# Patient Record
Sex: MALE | Race: WHITE | NOT HISPANIC OR LATINO | Employment: FULL TIME | URBAN - METROPOLITAN AREA
[De-identification: names, ages, dates, MRNs, and addresses within clinical notes are randomized per-mention and may not be internally consistent; named-entity substitution may affect disease eponyms.]

---

## 2017-01-20 RX ORDER — OMEPRAZOLE 20 MG/1
CAPSULE, DELAYED RELEASE ORAL
Qty: 30 CAPSULE | Refills: 5 | Status: SHIPPED | OUTPATIENT
Start: 2017-01-20 | End: 2017-07-28 | Stop reason: ALTCHOICE

## 2017-07-12 RX ORDER — OMEPRAZOLE 20 MG/1
CAPSULE, DELAYED RELEASE ORAL
Qty: 30 CAPSULE | Refills: 0 | OUTPATIENT
Start: 2017-07-12

## 2017-07-17 RX ORDER — OMEPRAZOLE 20 MG/1
CAPSULE, DELAYED RELEASE ORAL
Qty: 30 CAPSULE | Refills: 0 | OUTPATIENT
Start: 2017-07-17

## 2017-07-26 RX ORDER — OMEPRAZOLE 20 MG/1
CAPSULE, DELAYED RELEASE ORAL
Qty: 30 CAPSULE | Refills: 0 | OUTPATIENT
Start: 2017-07-26

## 2017-07-27 ENCOUNTER — TELEPHONE (OUTPATIENT)
Dept: FAMILY MEDICINE CLINIC | Facility: CLINIC | Age: 27
End: 2017-07-27

## 2017-07-27 NOTE — TELEPHONE ENCOUNTER
Patient was notified he has not been seen in the office in 2 years and will need an appt in order to refill medication. appt was scheduled.

## 2017-07-28 ENCOUNTER — OFFICE VISIT (OUTPATIENT)
Dept: FAMILY MEDICINE CLINIC | Facility: CLINIC | Age: 27
End: 2017-07-28

## 2017-07-28 VITALS
SYSTOLIC BLOOD PRESSURE: 126 MMHG | HEART RATE: 95 BPM | WEIGHT: 195 LBS | DIASTOLIC BLOOD PRESSURE: 76 MMHG | OXYGEN SATURATION: 98 % | BODY MASS INDEX: 27.3 KG/M2 | HEIGHT: 71 IN

## 2017-07-28 DIAGNOSIS — R12 HEARTBURN: ICD-10-CM

## 2017-07-28 DIAGNOSIS — Z13.220 ENCOUNTER FOR LIPID SCREENING FOR CARDIOVASCULAR DISEASE: Primary | ICD-10-CM

## 2017-07-28 DIAGNOSIS — Z13.6 ENCOUNTER FOR LIPID SCREENING FOR CARDIOVASCULAR DISEASE: Primary | ICD-10-CM

## 2017-07-28 PROBLEM — IMO0001 BRASH: Status: ACTIVE | Noted: 2017-07-28

## 2017-07-28 PROBLEM — J45.909 AIRWAY HYPERREACTIVITY: Status: ACTIVE | Noted: 2017-07-28

## 2017-07-28 PROCEDURE — 99213 OFFICE O/P EST LOW 20 MIN: CPT | Performed by: NURSE PRACTITIONER

## 2017-07-28 RX ORDER — ALFUZOSIN HYDROCHLORIDE 10 MG/1
TABLET, EXTENDED RELEASE ORAL
Refills: 3 | COMMUNITY
Start: 2017-07-03

## 2017-07-28 RX ORDER — FAMOTIDINE 20 MG/1
20 TABLET, FILM COATED ORAL 2 TIMES DAILY
Qty: 60 TABLET | Refills: 11 | Status: SHIPPED | OUTPATIENT
Start: 2017-07-28 | End: 2018-07-17 | Stop reason: SDUPTHER

## 2017-07-28 NOTE — PROGRESS NOTES
"Subjective   Evan Jose is a 26 y.o. male who is here for his yearly exam.     History of Present Illness   26yr old male, strong FH heartburn, no relatives with Lazo's esophagus, distant history of upper scope, desires refill on omeprazole. Eating more vegetables, less meat, and thinks helping heartburn. Takes prilosec daily, if skips by noon will have heartburn.     Asthma doing well, 2 x inhaler use in the last year, when around cats. Otherwise doesn't need.     Last cholesterol check unknown (>2 yrs), no FH heart disease, 2 grandparents with afib. FH prostate, bladder, and gastric cancer. No colon cancers.     Still seeing urology annually for enlarged prostate.     The following portions of the patient's history were reviewed and updated as appropriate: allergies, current medications, past family history, past medical history, past social history, past surgical history and problem list.    Review of Systems   Constitutional: Negative.  Negative for activity change and unexpected weight change.   HENT: Negative.    Eyes: Negative.  Negative for visual disturbance.   Respiratory: Negative.    Cardiovascular: Negative.  Negative for chest pain.   Gastrointestinal: Negative for constipation and diarrhea.        Heartburn significant   Endocrine: Negative.    Genitourinary: Positive for difficulty urinating. Negative for frequency.        Enlarged prostate   Musculoskeletal: Negative.    Neurological: Negative for weakness and headaches.   Hematological: Negative.    Psychiatric/Behavioral: Negative.  Negative for dysphoric mood.     /76 (BP Location: Left arm, Patient Position: Sitting, Cuff Size: Adult)  Pulse 95  Ht 71\" (180.3 cm)  Wt 195 lb (88.5 kg)  SpO2 98%  BMI 27.2 kg/m2    Objective   Physical Exam   Constitutional: He appears well-developed and well-nourished.   HENT:   Head: Normocephalic and atraumatic.   Neck: Normal range of motion. Neck supple. No thyromegaly present. "   Cardiovascular: Normal rate, regular rhythm and normal heart sounds.    Pulses:       Carotid pulses are 2+ on the right side, and 2+ on the left side.  Pulmonary/Chest: Effort normal and breath sounds normal.   Abdominal: Soft. Bowel sounds are normal.   Lymphadenopathy:     He has no cervical adenopathy.   Skin: He is not diaphoretic.   Psychiatric: He has a normal mood and affect. His behavior is normal. Judgment and thought content normal.       Assessment/Plan   Problems Addressed this Visit        Digestive    Brash    Relevant Medications    famotidine (PEPCID) 20 MG tablet      Other Visit Diagnoses     Encounter for lipid screening for cardiovascular disease    -  Primary    Relevant Orders    Lipid Panel        Last seen in 2015, would recommend trial of H2 to address heartburn, retrial omeprazole if insufficient relief. Consider repeat upper scope    Strongly encouraged smoking cessation. Has been vaping last 1 1/2 yrs, encouraged to wean off.     Encouraged to restart exercise as out of routine. FU 1-2 yrs.

## 2017-07-29 LAB
ALBUMIN SERPL-MCNC: 5 G/DL (ref 3.5–5.5)
ALBUMIN/GLOB SERPL: 2 {RATIO} (ref 1.2–2.2)
ALP SERPL-CCNC: 90 IU/L (ref 39–117)
ALT SERPL-CCNC: 26 IU/L (ref 0–44)
AST SERPL-CCNC: 23 IU/L (ref 0–40)
BASOPHILS # BLD AUTO: 0 X10E3/UL (ref 0–0.2)
BASOPHILS NFR BLD AUTO: 0 %
BILIRUB SERPL-MCNC: 0.6 MG/DL (ref 0–1.2)
BUN SERPL-MCNC: 13 MG/DL (ref 6–20)
BUN/CREAT SERPL: 14 (ref 9–20)
CALCIUM SERPL-MCNC: 9.9 MG/DL (ref 8.7–10.2)
CHLORIDE SERPL-SCNC: 100 MMOL/L (ref 96–106)
CHOLEST SERPL-MCNC: 190 MG/DL (ref 100–199)
CO2 SERPL-SCNC: 24 MMOL/L (ref 18–29)
CREAT SERPL-MCNC: 0.9 MG/DL (ref 0.76–1.27)
EOSINOPHIL # BLD AUTO: 0.2 X10E3/UL (ref 0–0.4)
EOSINOPHIL NFR BLD AUTO: 3 %
ERYTHROCYTE [DISTWIDTH] IN BLOOD BY AUTOMATED COUNT: 13.9 % (ref 12.3–15.4)
GLOBULIN SER CALC-MCNC: 2.5 G/DL (ref 1.5–4.5)
GLUCOSE SERPL-MCNC: 90 MG/DL (ref 65–99)
HCT VFR BLD AUTO: 48.9 % (ref 37.5–51)
HDLC SERPL-MCNC: 53 MG/DL
HGB BLD-MCNC: 16.6 G/DL (ref 12.6–17.7)
IMM GRANULOCYTES # BLD: 0 X10E3/UL (ref 0–0.1)
IMM GRANULOCYTES NFR BLD: 0 %
LDLC SERPL CALC-MCNC: 117 MG/DL (ref 0–99)
LYMPHOCYTES # BLD AUTO: 1.5 X10E3/UL (ref 0.7–3.1)
LYMPHOCYTES NFR BLD AUTO: 25 %
MCH RBC QN AUTO: 30.2 PG (ref 26.6–33)
MCHC RBC AUTO-ENTMCNC: 33.9 G/DL (ref 31.5–35.7)
MCV RBC AUTO: 89 FL (ref 79–97)
MONOCYTES # BLD AUTO: 0.3 X10E3/UL (ref 0.1–0.9)
MONOCYTES NFR BLD AUTO: 6 %
NEUTROPHILS # BLD AUTO: 3.9 X10E3/UL (ref 1.4–7)
NEUTROPHILS NFR BLD AUTO: 66 %
PLATELET # BLD AUTO: 230 X10E3/UL (ref 150–379)
POTASSIUM SERPL-SCNC: 4.7 MMOL/L (ref 3.5–5.2)
PROT SERPL-MCNC: 7.5 G/DL (ref 6–8.5)
RBC # BLD AUTO: 5.49 X10E6/UL (ref 4.14–5.8)
SODIUM SERPL-SCNC: 144 MMOL/L (ref 134–144)
TRIGL SERPL-MCNC: 102 MG/DL (ref 0–149)
VLDLC SERPL CALC-MCNC: 20 MG/DL (ref 5–40)
WBC # BLD AUTO: 5.9 X10E3/UL (ref 3.4–10.8)

## 2017-07-31 ENCOUNTER — TELEPHONE (OUTPATIENT)
Dept: FAMILY MEDICINE CLINIC | Facility: CLINIC | Age: 27
End: 2017-07-31

## 2017-07-31 NOTE — TELEPHONE ENCOUNTER
----- Message from CARINE Lilly sent at 7/29/2017 11:34 AM EDT -----  Normal lab results blood sugar and cholesterol screening normal.

## 2017-08-31 ENCOUNTER — OFFICE VISIT (OUTPATIENT)
Dept: FAMILY MEDICINE CLINIC | Facility: CLINIC | Age: 27
End: 2017-08-31

## 2017-08-31 VITALS
HEART RATE: 73 BPM | SYSTOLIC BLOOD PRESSURE: 112 MMHG | HEIGHT: 71 IN | BODY MASS INDEX: 27.16 KG/M2 | TEMPERATURE: 98.2 F | WEIGHT: 194 LBS | DIASTOLIC BLOOD PRESSURE: 64 MMHG | OXYGEN SATURATION: 97 %

## 2017-08-31 DIAGNOSIS — B34.9 VIRAL ILLNESS: ICD-10-CM

## 2017-08-31 DIAGNOSIS — R50.81 FEVER IN OTHER DISEASES: Primary | ICD-10-CM

## 2017-08-31 PROCEDURE — 99213 OFFICE O/P EST LOW 20 MIN: CPT | Performed by: NURSE PRACTITIONER

## 2017-09-06 LAB
B BURGDOR IGM SER IA-ACNC: <0.8 INDEX (ref 0–0.79)
ERYTHROCYTE [DISTWIDTH] IN BLOOD BY AUTOMATED COUNT: 13 % (ref 11.5–14.5)
HCT VFR BLD AUTO: 48.3 % (ref 40.4–52.2)
HGB BLD-MCNC: 16.2 G/DL (ref 13.7–17.6)
MCH RBC QN AUTO: 30.3 PG (ref 27–32.7)
MCHC RBC AUTO-ENTMCNC: 33.5 G/DL (ref 32.6–36.4)
MCV RBC AUTO: 90.3 FL (ref 79.8–96.2)
PLATELET # BLD AUTO: 170 10*3/MM3 (ref 140–500)
R RICKETTSI IGG SER QL IA: POSITIVE
R RICKETTSI IGG TITR SER IF: ABNORMAL {TITER}
R RICKETTSI IGM TITR SER: 0.3 INDEX (ref 0–0.89)
RBC # BLD AUTO: 5.35 10*6/MM3 (ref 4.6–6)
WBC # BLD AUTO: 2.65 10*3/MM3 (ref 4.5–10.7)
WNV IGG SER QL IA: NEGATIVE
WNV IGM SER QL IA: NEGATIVE

## 2017-09-07 ENCOUNTER — TELEPHONE (OUTPATIENT)
Dept: FAMILY MEDICINE CLINIC | Facility: CLINIC | Age: 27
End: 2017-09-07

## 2017-09-07 RX ORDER — DOXYCYCLINE HYCLATE 100 MG
100 TABLET ORAL 2 TIMES DAILY
Qty: 14 TABLET | Refills: 0 | Status: SHIPPED | OUTPATIENT
Start: 2017-09-07 | End: 2018-06-29

## 2018-06-29 ENCOUNTER — OFFICE VISIT (OUTPATIENT)
Dept: FAMILY MEDICINE CLINIC | Facility: CLINIC | Age: 28
End: 2018-06-29

## 2018-06-29 VITALS
HEART RATE: 80 BPM | SYSTOLIC BLOOD PRESSURE: 118 MMHG | HEIGHT: 71 IN | BODY MASS INDEX: 25.9 KG/M2 | WEIGHT: 185 LBS | TEMPERATURE: 97.9 F | DIASTOLIC BLOOD PRESSURE: 62 MMHG | OXYGEN SATURATION: 96 %

## 2018-06-29 DIAGNOSIS — M25.50 ARTHRALGIA OF MULTIPLE JOINTS: ICD-10-CM
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
## 2018-06-29 LAB
HCT VFR BLDA CALC: 46.8 %
HGB BLDA-MCNC: 15.7 G/DL
MCH, POC: 30.1
MCHC, POC: 33.6
MCV, POC: 89.5
PLATELET # BLD AUTO: 210 10*3/MM3
PMV BLD: 6.8 FL
RBC, POC: 5.23
RDW, POC: 12.9
WBC # BLD: 5.4 10*3/UL

## 2018-06-29 PROCEDURE — 99213 OFFICE O/P EST LOW 20 MIN: CPT | Performed by: NURSE PRACTITIONER

## 2018-06-29 PROCEDURE — 85027 COMPLETE CBC AUTOMATED: CPT | Performed by: NURSE PRACTITIONER

## 2018-06-29 NOTE — PROGRESS NOTES
"Subjective   Evan Jose is a 27 y.o. male who presents c/o joint aches all over x 2-3 weeks. Had tick bite in May and several mosquito bites 3 weeks ago.     History of Present Illness   Aching worse in the last few weeks. 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 Has travelled to [place] and got bit by mosquitos there. 1 tick did get engorged behind the L knee, on body for 24 hours. No red hot or swollen joints, no fever. No rash or bull's eye after tick removed.   The following portions of the patient's history were reviewed and updated as appropriate: allergies, current medications, past family history, past medical history, past social history, past surgical history and problem list.    Review of Systems   Constitutional: Negative for chills and fever.   Respiratory: Negative.    Cardiovascular: Negative.    Musculoskeletal: Positive for arthralgias (low level ache, better with activity. ) and back pain. Negative for joint swelling, neck pain and neck stiffness.   Skin: Negative.    Psychiatric/Behavioral: Negative.      /62   Pulse 80   Temp 97.9 °F (36.6 °C) (Oral)   Ht 180.3 cm (71\")   Wt 83.9 kg (185 lb)   SpO2 96%   BMI 25.80 kg/m²     Objective   Physical Exam   Constitutional: He is oriented to person, place, and time. He appears well-developed and well-nourished.   Cardiovascular: Normal rate, regular rhythm and normal heart sounds.    Pulmonary/Chest: Effort normal and breath sounds normal.   Musculoskeletal:        Right elbow: Normal.       Left elbow: Normal.        Right knee: Normal.        Left knee: Normal.        Right ankle: Normal.        Left ankle: Normal.   Neurological: He is alert and oriented to person, place, and time.   Psychiatric: He has a normal mood and affect. His behavior is normal. Judgment and thought content normal.   Nursing note and vitals reviewed.    Assessment/Plan   Problems Addressed this Visit     None      Visit Diagnoses     Tick " bite, initial encounter    -  Primary    Relevant Orders    POC CBC    C-reactive Protein    Sedimentation Rate    Arthralgia of multiple joints        Relevant Orders    POC CBC    C-reactive Protein    Sedimentation Rate        Increase use of bug spray, permetherin to clothing for tick avoidance. Doubt role of doxycycline for tick bite as 3 weeks ago and no rash. Will check inflammatory markers. FU if not settling 2 weeks.   WBC 5.4 today, await inflammatory markers.

## 2018-06-30 LAB
CRP SERPL-MCNC: 0.4 MG/L (ref 0–4.9)
ERYTHROCYTE [SEDIMENTATION RATE] IN BLOOD BY WESTERGREN METHOD: 2 MM/HR (ref 0–15)

## 2018-07-17 DIAGNOSIS — R12 HEARTBURN: ICD-10-CM

## 2018-07-17 RX ORDER — FAMOTIDINE 20 MG/1
TABLET, FILM COATED ORAL
Qty: 60 TABLET | Refills: 5 | Status: SHIPPED | OUTPATIENT
Start: 2018-07-17 | End: 2019-01-08 | Stop reason: SDUPTHER

## 2018-09-04 ENCOUNTER — OFFICE VISIT (OUTPATIENT)
Dept: FAMILY MEDICINE CLINIC | Facility: CLINIC | Age: 28
End: 2018-09-04

## 2018-09-04 VITALS
BODY MASS INDEX: 26.18 KG/M2 | HEART RATE: 81 BPM | SYSTOLIC BLOOD PRESSURE: 120 MMHG | DIASTOLIC BLOOD PRESSURE: 74 MMHG | OXYGEN SATURATION: 98 % | HEIGHT: 71 IN | TEMPERATURE: 99 F | WEIGHT: 187 LBS
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
## 2018-09-04 DIAGNOSIS — R50.9 FEVER, UNSPECIFIED FEVER CAUSE: Primary | ICD-10-CM

## 2018-09-04 PROCEDURE — 99213 OFFICE O/P EST LOW 20 MIN: CPT | Performed by: NURSE PRACTITIONER

## 2018-09-04 RX ORDER — DOXYCYCLINE HYCLATE 100 MG/1
100 CAPSULE ORAL 2 TIMES DAILY
Qty: 14 CAPSULE | Refills: 0 | Status: SHIPPED | OUTPATIENT
Start: 2018-09-04 | End: 2019-06-06

## 2018-09-04 NOTE — PROGRESS NOTES
"Subjective   Evan Jose is a 27 y.o. male who presents c/o fever, headache, body aches x 4 days. Also with bumps to legs and feet that are spreading. [place]     History of Present Illness   Aches in elbows shoulders and back, waking covered in sweat. Red bumps not healing, and seem to be spreading to madhu ankles. Did have 2 seed ticks attached to feet. Not engorged.   The following portions of the patient's history were reviewed and updated as appropriate: allergies, current medications, past family history, past medical history, past social history, past surgical history and problem list.    Review of Systems   Constitutional: Positive for chills, diaphoresis and fever. Negative for unexpected weight gain and unexpected weight loss.   HENT: Negative for congestion and sore throat.    Cardiovascular: Negative.    Gastrointestinal: Positive for nausea. Negative for diarrhea and vomiting.   Musculoskeletal: Positive for arthralgias and back pain.   Neurological: Positive for headache.   Hematological: Positive for adenopathy.   Psychiatric/Behavioral: Negative.      /74   Pulse 81   Temp 99 °F (37.2 °C) (Oral)   Ht 180.3 cm (71\")   Wt 84.8 kg (187 lb)   SpO2 98%   BMI 26.08 kg/m²     Objective   Physical Exam   Constitutional: He is oriented to person, place, and time. He appears well-developed and well-nourished.   Neck: Normal range of motion and full passive range of motion without pain. Neck supple.   Cardiovascular: Normal rate, regular rhythm and normal heart sounds.    Pulmonary/Chest: Effort normal and breath sounds normal.   Abdominal: Soft. Bowel sounds are normal. There is no tenderness. There is no CVA tenderness.   Musculoskeletal: Normal range of motion.        Right elbow: Normal.       Left elbow: Normal.        Lumbar back: Normal.   Neurological: He is alert and oriented to person, place, and time.   Skin: Skin is warm and dry. Capillary " refill takes less than 2 seconds.   1-2mm papules scattered to bilateral ankles   Psychiatric: He has a normal mood and affect. His behavior is normal. Judgment and thought content normal.   Nursing note and vitals reviewed.    Assessment/Plan   Problems Addressed this Visit     None      Visit Diagnoses     Fever, unspecified fever cause    -  Primary    Relevant Orders    CBC & Differential    Rickettsial Fever Group IgG / M    [place] SF (IgG / M)    Lyme, IgM, Early Test / Reflex    Tick bite, initial encounter        Relevant Medications    doxycycline (VIBRAMYCIN) 100 MG capsule    Other Relevant Orders    CBC & Differential    Rickettsial Fever Group IgG / M    Stuckey SF (IgG / M)    Lyme, IgM, Early Test / Reflex        Is using permethrin to clothing for tick avoidance, but still some tick bites, no classic bull's eye rash. Will treat for tick bourne illness. To return for tick titers if symptoms not settling 24 hours.

## 2019-01-08 DIAGNOSIS — R12 HEARTBURN: ICD-10-CM

## 2019-01-08 RX ORDER — FAMOTIDINE 20 MG/1
TABLET, FILM COATED ORAL
Qty: 60 TABLET | Refills: 2 | Status: SHIPPED | OUTPATIENT
Start: 2019-01-08 | End: 2019-04-06 | Stop reason: SDUPTHER

## 2019-04-06 DIAGNOSIS — R12 HEARTBURN: ICD-10-CM

## 2019-04-08 RX ORDER — FAMOTIDINE 20 MG/1
TABLET, FILM COATED ORAL
Qty: 60 TABLET | Refills: 0 | Status: SHIPPED | OUTPATIENT
Start: 2019-04-08 | End: 2019-05-05 | Stop reason: SDUPTHER

## 2019-05-05 DIAGNOSIS — R12 HEARTBURN: ICD-10-CM

## 2019-05-06 RX ORDER — FAMOTIDINE 20 MG/1
TABLET, FILM COATED ORAL
Qty: 60 TABLET | Refills: 3 | Status: SHIPPED | OUTPATIENT
Start: 2019-05-06 | End: 2019-09-01 | Stop reason: SDUPTHER

## 2019-06-06 ENCOUNTER — OFFICE VISIT (OUTPATIENT)
Dept: FAMILY MEDICINE CLINIC | Facility: CLINIC | Age: 29
End: 2019-06-06

## 2019-06-06 VITALS
OXYGEN SATURATION: 99 % | TEMPERATURE: 98.4 F | SYSTOLIC BLOOD PRESSURE: 120 MMHG | HEIGHT: 71 IN | DIASTOLIC BLOOD PRESSURE: 68 MMHG | WEIGHT: 175 LBS | BODY MASS INDEX: 24.5 KG/M2 | HEART RATE: 78 BPM
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
## 2019-06-06 DIAGNOSIS — Z23 IMMUNIZATION DUE: ICD-10-CM

## 2019-06-06 PROCEDURE — 90471 IMMUNIZATION ADMIN: CPT | Performed by: NURSE PRACTITIONER

## 2019-06-06 PROCEDURE — 99213 OFFICE O/P EST LOW 20 MIN: CPT | Performed by: NURSE PRACTITIONER

## 2019-06-06 PROCEDURE — 90714 TD VACC NO PRESV 7 YRS+ IM: CPT | Performed by: NURSE PRACTITIONER

## 2019-06-06 RX ORDER — DOXYCYCLINE HYCLATE 100 MG/1
100 CAPSULE ORAL 2 TIMES DAILY
Qty: 14 CAPSULE | Refills: 0 | Status: SHIPPED | OUTPATIENT
Start: 2019-06-06 | End: 2019-10-21

## 2019-06-06 NOTE — PROGRESS NOTES
"Subjective   Evan Jose is a 28 y.o. male. Patient presents today with two tick bites that are causing joint pain and body aches.     History of Present Illness   Tick bites occurred 3 weeks ago on vacation, did spray clothes with permethrin, still got bit. Ticks were attached for an unknown amount of time, 8-10 hours. Still has whelps and itch, did not have to be removed. Did not develop a rash. Having joint pain and body aches, some myalgias, knuckles elbows, knees and hips. Biceps, shoulders. Fished for 6 days. Initially attributed aches to the extensive fishing, but did not resolve with return to work, no fever.   The following portions of the patient's history were reviewed and updated as appropriate: allergies, current medications, past family history, past medical history, past social history, past surgical history and problem list.    Review of Systems   Constitutional: Positive for activity change. Negative for chills and fever.   Cardiovascular: Negative.    Gastrointestinal: Positive for abdominal pain (attributes to stress from a new job).   Musculoskeletal: Positive for arthralgias and myalgias.   Skin: Positive for skin lesions. Negative for rash.   Neurological: Positive for headache (rare).   Hematological: Negative for adenopathy.   Psychiatric/Behavioral: Negative.      /68 (BP Location: Left arm, Patient Position: Sitting, Cuff Size: Adult)   Pulse 78   Temp 98.4 °F (36.9 °C) (Oral)   Ht 180.3 cm (71\")   Wt 79.4 kg (175 lb)   SpO2 99%   BMI 24.41 kg/m²     Objective   Physical Exam   Constitutional: He appears well-developed and well-nourished.  Non-toxic appearance. No distress.   Cardiovascular: Normal rate.   Pulmonary/Chest: Effort normal.   Skin: Skin is warm and dry. No rash noted. He is not diaphoretic.   2mm red punctate bumps with white cores x 2 to L trunk   Psychiatric: He has a normal mood and affect. His behavior is normal. Judgment and thought content normal. "   Nursing note and vitals reviewed.    Assessment/Plan   Problems Addressed this Visit     None      Visit Diagnoses     Tick bite, initial encounter    -  Primary    Relevant Medications    doxycycline (VIBRAMYCIN) 100 MG capsule    Other Relevant Orders    CBC & Differential    Rickettsial Fever Group IgG / M    [place] SF (IgG / M)    Lyme, IgM, Early Test / Reflex    Immunization due        Relevant Orders    Td Vaccine Greater Than or Equal To 6yo Preservative Free IM (Completed)        As symptomatic, will go on and treat, recommend titers to evaluate reinfection. Follow up if any increase in symptoms.

## 2019-06-10 LAB
B BURGDOR IGM SER IA-ACNC: <0.8 INDEX (ref 0–0.79)
BASOPHILS # BLD AUTO: 0.01 10*3/MM3 (ref 0–0.2)
BASOPHILS NFR BLD AUTO: 0.2 % (ref 0–1.5)
EOSINOPHIL # BLD AUTO: 0.11 10*3/MM3 (ref 0–0.4)
EOSINOPHIL NFR BLD AUTO: 2.7 % (ref 0.3–6.2)
ERYTHROCYTE [DISTWIDTH] IN BLOOD BY AUTOMATED COUNT: 12.7 % (ref 12.3–15.4)
HCT VFR BLD AUTO: 49.9 % (ref 37.5–51)
HGB BLD-MCNC: 16.7 G/DL (ref 13–17.7)
IMM GRANULOCYTES # BLD AUTO: 0.01 10*3/MM3 (ref 0–0.05)
IMM GRANULOCYTES NFR BLD AUTO: 0.2 % (ref 0–0.5)
LYMPHOCYTES # BLD AUTO: 1.17 10*3/MM3 (ref 0.7–3.1)
LYMPHOCYTES NFR BLD AUTO: 29.1 % (ref 19.6–45.3)
MCH RBC QN AUTO: 29.6 PG (ref 26.6–33)
MCHC RBC AUTO-ENTMCNC: 33.5 G/DL (ref 31.5–35.7)
MCV RBC AUTO: 88.5 FL (ref 79–97)
MONOCYTES # BLD AUTO: 0.27 10*3/MM3 (ref 0.1–0.9)
MONOCYTES NFR BLD AUTO: 6.7 % (ref 5–12)
NEUTROPHILS # BLD AUTO: 2.45 10*3/MM3 (ref 1.7–7)
NEUTROPHILS NFR BLD AUTO: 61.1 % (ref 42.7–76)
NRBC BLD AUTO-RTO: 0 /100 WBC (ref 0–0.2)
PLATELET # BLD AUTO: 231 10*3/MM3 (ref 140–450)
R RICKETTSI IGG SER QL IA: POSITIVE
R RICKETTSI IGG TITR SER IF: NORMAL {TITER}
R RICKETTSI IGM SER-ACNC: 0.3 INDEX (ref 0–0.89)
RBC # BLD AUTO: 5.64 10*6/MM3 (ref 4.14–5.8)
RICK SF IGG TITR SER IF: NORMAL {TITER}
RICK SF IGM TITR SER IF: NORMAL {TITER}
RICK TYPHUS IGG TITR SER IF: NORMAL {TITER}
RICK TYPHUS IGM TITR SER IF: NORMAL {TITER}
WBC # BLD AUTO: 4.02 10*3/MM3 (ref 3.4–10.8)

## 2019-06-10 NOTE — PROGRESS NOTES
Please call the patient regarding his abnormal result. Evidence of old [place] spotted fever, but no recent infection.

## 2019-09-01 DIAGNOSIS — R12 HEARTBURN: ICD-10-CM

## 2019-09-03 DIAGNOSIS — R12 HEARTBURN: ICD-10-CM

## 2019-09-03 RX ORDER — FAMOTIDINE 20 MG/1
TABLET, FILM COATED ORAL
Qty: 60 TABLET | Refills: 0 | Status: SHIPPED | OUTPATIENT
Start: 2019-09-03 | End: 2019-10-03 | Stop reason: SDUPTHER

## 2019-09-03 RX ORDER — FAMOTIDINE 20 MG/1
TABLET, FILM COATED ORAL
Qty: 60 TABLET | Refills: 0 | Status: SHIPPED | OUTPATIENT
Start: 2019-09-03 | End: 2019-10-31 | Stop reason: SDUPTHER

## 2019-10-03 DIAGNOSIS — R12 HEARTBURN: ICD-10-CM

## 2019-10-03 RX ORDER — FAMOTIDINE 20 MG/1
TABLET, FILM COATED ORAL
Qty: 60 TABLET | Refills: 0 | Status: SHIPPED | OUTPATIENT
Start: 2019-10-03 | End: 2019-10-21 | Stop reason: SDUPTHER

## 2019-10-21 ENCOUNTER — OFFICE VISIT (OUTPATIENT)
Dept: FAMILY MEDICINE CLINIC | Facility: CLINIC | Age: 29
End: 2019-10-21

## 2019-10-21 VITALS
BODY MASS INDEX: 24.36 KG/M2 | HEIGHT: 71 IN | OXYGEN SATURATION: 98 % | TEMPERATURE: 98.2 F | WEIGHT: 174 LBS | SYSTOLIC BLOOD PRESSURE: 124 MMHG | HEART RATE: 60 BPM | DIASTOLIC BLOOD PRESSURE: 64 MMHG

## 2019-10-21 DIAGNOSIS — F17.210 CIGARETTE NICOTINE DEPENDENCE WITHOUT COMPLICATION: ICD-10-CM

## 2019-10-21 DIAGNOSIS — Z13.220 LIPID SCREENING: ICD-10-CM

## 2019-10-21 DIAGNOSIS — Z23 IMMUNIZATION DUE: ICD-10-CM

## 2019-10-21 DIAGNOSIS — Z71.6 TOBACCO ABUSE COUNSELING: ICD-10-CM

## 2019-10-21 DIAGNOSIS — Z00.00 ROUTINE ADULT HEALTH MAINTENANCE: Primary | ICD-10-CM

## 2019-10-21 PROBLEM — Z72.0 TOBACCO ABUSE: Status: ACTIVE | Noted: 2019-10-21

## 2019-10-21 PROCEDURE — 90471 IMMUNIZATION ADMIN: CPT | Performed by: NURSE PRACTITIONER

## 2019-10-21 PROCEDURE — 99406 BEHAV CHNG SMOKING 3-10 MIN: CPT | Performed by: NURSE PRACTITIONER

## 2019-10-21 PROCEDURE — 36415 COLL VENOUS BLD VENIPUNCTURE: CPT | Performed by: NURSE PRACTITIONER

## 2019-10-21 PROCEDURE — 99395 PREV VISIT EST AGE 18-39: CPT | Performed by: NURSE PRACTITIONER

## 2019-10-21 PROCEDURE — 90674 CCIIV4 VAC NO PRSV 0.5 ML IM: CPT | Performed by: NURSE PRACTITIONER

## 2019-10-21 NOTE — PROGRESS NOTES
"Subjective   Evan Jose is a 29 y.o. male who presents for a routine physical and to discuss smoking cessation.     History of Present Illness   Denies any problems or concerns. Reports exercise almost daily  With yoga and weight lifting trying to do 5 to 6 days a week. Reports heats healthy about half the time usually goes for quickly cooked processed foods in the evenings but avoids sweets and sugar.  Tobacco abuse--Reports wants to stop smoking. Reports has tried gum switched to vapeing the switched back smokes less than half a pack a day  And has been smoking for 10 to 12 years. Reports father used chantix  And he would like to try that first.    The following portions of the patient's history were reviewed and updated as appropriate: allergies, current medications, past family history, past medical history, past social history, past surgical history and problem list.    Review of Systems   Constitutional: Negative for activity change and fatigue.   HENT: Negative.    Respiratory: Negative for shortness of breath.    Gastrointestinal: Positive for indigestion (controlls with tums and diet). Negative for constipation and diarrhea.   Musculoskeletal: Negative.    Skin: Negative.    Allergic/Immunologic: Positive for environmental allergies (cat hair and dander).   Neurological: Negative for dizziness, weakness and headache.   Psychiatric/Behavioral: Negative for sleep disturbance. The patient is not nervous/anxious.      /64   Pulse 60   Temp 98.2 °F (36.8 °C) (Oral)   Ht 180.3 cm (71\")   Wt 78.9 kg (174 lb)   SpO2 98%   BMI 24.27 kg/m²     Objective   Physical Exam   Constitutional: He is oriented to person, place, and time. He appears well-developed and well-nourished.   HENT:   Head: Normocephalic.   Neck: Normal range of motion. Neck supple.   Cardiovascular: Normal rate, regular rhythm and normal heart sounds.   Pulmonary/Chest: Effort normal and breath sounds normal.   Abdominal: Soft. Bowel " sounds are normal.   Musculoskeletal: Normal range of motion.   Neurological: He is alert and oriented to person, place, and time.   Skin: Skin is warm and dry.   Psychiatric: He has a normal mood and affect. His behavior is normal. Judgment and thought content normal.   Nursing note and vitals reviewed.    Assessment/Plan   Problems Addressed this Visit        Other    Routine adult health maintenance - Primary    Tobacco abuse    Tobacco abuse counseling    Immunization due      Other Visit Diagnoses     Lipid screening            Health maintenance--physical examination, collect labs  Lipid screening  Diet and nutrition counseling  Flu vaccine administered  Tobacco abuse counseling--discussed risks and benefits of chantix, side effects and how to manage.    Evan Jose is a current cigarettes user.  He currently smokes 10 pack of cigarettes and cigarettes per day for a duration of 12 years. I have educated him on the risk of diseases from using tobacco products such as cancer, COPD and heart diease.     I advised him to quit and he is willing to quit. We have discussed the following method/s for tobacco cessation:  Prescription Medicaiton.  Together we have set a quit date for gradual quit method with chantix.  He will follow up with me in 6 months or sooner to check on his progress.    I spent 10 minutes counseling the patient.

## 2019-10-22 LAB
ALBUMIN SERPL-MCNC: 4.9 G/DL (ref 3.5–5.5)
ALBUMIN/GLOB SERPL: 2.1 {RATIO} (ref 1.2–2.2)
ALP SERPL-CCNC: 120 IU/L (ref 39–117)
ALT SERPL-CCNC: 35 IU/L (ref 0–44)
AST SERPL-CCNC: 31 IU/L (ref 0–40)
BASOPHILS # BLD AUTO: 0 X10E3/UL (ref 0–0.2)
BASOPHILS NFR BLD AUTO: 0 %
BILIRUB SERPL-MCNC: 0.4 MG/DL (ref 0–1.2)
BUN SERPL-MCNC: 17 MG/DL (ref 6–20)
BUN/CREAT SERPL: 18 (ref 9–20)
CALCIUM SERPL-MCNC: 9.6 MG/DL (ref 8.7–10.2)
CHLORIDE SERPL-SCNC: 103 MMOL/L (ref 96–106)
CHOLEST SERPL-MCNC: 160 MG/DL (ref 100–199)
CO2 SERPL-SCNC: 22 MMOL/L (ref 20–29)
CREAT SERPL-MCNC: 0.92 MG/DL (ref 0.76–1.27)
EOSINOPHIL # BLD AUTO: 0.3 X10E3/UL (ref 0–0.4)
EOSINOPHIL NFR BLD AUTO: 4 %
ERYTHROCYTE [DISTWIDTH] IN BLOOD BY AUTOMATED COUNT: 13.5 % (ref 12.3–15.4)
GLOBULIN SER CALC-MCNC: 2.3 G/DL (ref 1.5–4.5)
GLUCOSE SERPL-MCNC: 88 MG/DL (ref 65–99)
HCT VFR BLD AUTO: 48.2 % (ref 37.5–51)
HDLC SERPL-MCNC: 45 MG/DL
HGB BLD-MCNC: 16.5 G/DL (ref 13–17.7)
IMM GRANULOCYTES # BLD AUTO: 0 X10E3/UL (ref 0–0.1)
IMM GRANULOCYTES NFR BLD AUTO: 0 %
LDLC SERPL CALC-MCNC: 97 MG/DL (ref 0–99)
LYMPHOCYTES # BLD AUTO: 2 X10E3/UL (ref 0.7–3.1)
LYMPHOCYTES NFR BLD AUTO: 30 %
MCH RBC QN AUTO: 29.4 PG (ref 26.6–33)
MCHC RBC AUTO-ENTMCNC: 34.2 G/DL (ref 31.5–35.7)
MCV RBC AUTO: 86 FL (ref 79–97)
MONOCYTES # BLD AUTO: 0.4 X10E3/UL (ref 0.1–0.9)
MONOCYTES NFR BLD AUTO: 5 %
NEUTROPHILS # BLD AUTO: 4.1 X10E3/UL (ref 1.4–7)
NEUTROPHILS NFR BLD AUTO: 61 %
PLATELET # BLD AUTO: 252 X10E3/UL (ref 150–450)
POTASSIUM SERPL-SCNC: 4.1 MMOL/L (ref 3.5–5.2)
PROT SERPL-MCNC: 7.2 G/DL (ref 6–8.5)
RBC # BLD AUTO: 5.61 X10E6/UL (ref 4.14–5.8)
SODIUM SERPL-SCNC: 142 MMOL/L (ref 134–144)
TRIGL SERPL-MCNC: 91 MG/DL (ref 0–149)
VLDLC SERPL CALC-MCNC: 18 MG/DL (ref 5–40)
WBC # BLD AUTO: 6.8 X10E3/UL (ref 3.4–10.8)

## 2019-10-31 DIAGNOSIS — R12 HEARTBURN: ICD-10-CM

## 2019-10-31 RX ORDER — FAMOTIDINE 20 MG/1
TABLET, FILM COATED ORAL
Qty: 60 TABLET | Refills: 3 | Status: SHIPPED | OUTPATIENT
Start: 2019-10-31 | End: 2020-03-09

## 2019-11-13 DIAGNOSIS — Z71.6 TOBACCO ABUSE COUNSELING: ICD-10-CM

## 2019-11-13 DIAGNOSIS — F17.210 CIGARETTE NICOTINE DEPENDENCE WITHOUT COMPLICATION: ICD-10-CM

## 2019-11-13 RX ORDER — VARENICLINE TARTRATE 1 MG/1
1 TABLET, FILM COATED ORAL 2 TIMES DAILY
Qty: 60 TABLET | Refills: 0 | Status: SHIPPED | OUTPATIENT
Start: 2019-11-13 | End: 2020-02-17

## 2020-02-17 ENCOUNTER — OFFICE VISIT (OUTPATIENT)
Dept: FAMILY MEDICINE CLINIC | Facility: CLINIC | Age: 30
End: 2020-02-17

## 2020-02-17 VITALS
BODY MASS INDEX: 24.64 KG/M2 | WEIGHT: 176 LBS | OXYGEN SATURATION: 99 % | HEART RATE: 73 BPM | TEMPERATURE: 98.4 F | HEIGHT: 71 IN | SYSTOLIC BLOOD PRESSURE: 118 MMHG | DIASTOLIC BLOOD PRESSURE: 60 MMHG

## 2020-02-17 DIAGNOSIS — Z86.19 HX OF ROCKY MOUNTAIN SPOTTED FEVER: ICD-10-CM

## 2020-02-17 DIAGNOSIS — Z82.61 FH: RHEUMATOID ARTHRITIS: ICD-10-CM

## 2020-02-17 DIAGNOSIS — M25.531 BILATERAL WRIST PAIN: ICD-10-CM

## 2020-02-17 DIAGNOSIS — M25.511 PAIN OF BOTH SHOULDER JOINTS: Primary | ICD-10-CM

## 2020-02-17 DIAGNOSIS — M25.532 BILATERAL WRIST PAIN: ICD-10-CM

## 2020-02-17 DIAGNOSIS — M25.512 PAIN OF BOTH SHOULDER JOINTS: Primary | ICD-10-CM

## 2020-02-17 DIAGNOSIS — L23.7 ALLERGIC CONTACT DERMATITIS DUE TO PLANTS, EXCEPT FOOD: ICD-10-CM

## 2020-02-17 PROCEDURE — 99214 OFFICE O/P EST MOD 30 MIN: CPT | Performed by: NURSE PRACTITIONER

## 2020-02-17 RX ORDER — TRIAMCINOLONE ACETONIDE 0.25 MG/G
OINTMENT TOPICAL 2 TIMES DAILY
Qty: 15 G | Refills: 0 | Status: SHIPPED | OUTPATIENT
Start: 2020-02-17 | End: 2020-05-27

## 2020-02-17 NOTE — PROGRESS NOTES
"Subjective   Evan Jose is a 29 y.o. male who presents c/o widespread body aches. Found tick on right knee last week and now has a rash on right thigh.     Rash   This is a new problem. The current episode started in the past 7 days. The problem is unchanged. The affected locations include theright upper leg. The rash is characterized by itchiness. It is unknown if there was an exposure to a precipitant. Associated symptoms include fatigue and joint pain. Pertinent negatives include no anorexia, congestion, cough, diarrhea, eye pain, facial edema, fever, nail changes, rhinorrhea, shortness of breath, sore throat or vomiting.      Tick was crawling on him, no bite. Has been in [employer], cut up from this. Weird rash x 1 week. Having joint pain to fingers, wrists, elbows, shoulders with pain x 2 months. Working in [place] [employer], painful. Is exercising with yoga 3-5 x week, seems to mitigate the joint pain some.   The following portions of the patient's history were reviewed and updated as appropriate: allergies, current medications, past family history, past medical history, past social history, past surgical history and problem list.    Review of Systems   Constitutional: Positive for fatigue. Negative for fever.   HENT: Negative for congestion, rhinorrhea and sore throat.    Eyes: Negative for pain.   Respiratory: Negative for cough and shortness of breath.    Gastrointestinal: Negative for anorexia, diarrhea and vomiting.   Musculoskeletal: Positive for joint pain. Negative for joint swelling.   Skin: Positive for rash. Negative for color change and nail changes.   Neurological: Negative.    Hematological: Negative.    Psychiatric/Behavioral: Negative.      /60   Pulse 73   Temp 98.4 °F (36.9 °C) (Oral)   Ht 180.3 cm (71\")   Wt 79.8 kg (176 lb)   SpO2 99%   BMI 24.55 kg/m²     Objective   Physical Exam   Constitutional: He is oriented to person, place, and time. He appears well-developed and " well-nourished.   Neck: Normal range of motion. Neck supple. No tracheal deviation present. No thyromegaly present.   Cardiovascular: Normal rate, regular rhythm and normal heart sounds.   Pulses:       Carotid pulses are 2+ on the right side, and 2+ on the left side.  Pulmonary/Chest: Effort normal and breath sounds normal.   Musculoskeletal:        Right shoulder: He exhibits decreased range of motion (very mild dec ext rotation) and crepitus. He exhibits no tenderness, no bony tenderness, no spasm, normal pulse and normal strength.        Left shoulder: He exhibits decreased range of motion (very mild dec internal rotation) and crepitus. He exhibits no tenderness, no bony tenderness, no deformity, no pain, no spasm and normal strength.        Right elbow: He exhibits normal range of motion, no swelling and no deformity. Tenderness found. Lateral epicondyle tenderness noted.        Left elbow: Normal.        Right wrist: Normal.        Left wrist: Normal.        Left hand: He exhibits bony tenderness (thenar base only). He exhibits normal range of motion. Normal sensation noted. Normal strength noted.   No tenosynovitis   Lymphadenopathy:     He has no cervical adenopathy.   Neurological: He is alert and oriented to person, place, and time.   Skin: Skin is warm and dry. Rash noted.   R thigh irregular patch of red papules with evidence of excoriation. 4 x 6 cm   Psychiatric: He has a normal mood and affect. His behavior is normal. Judgment and thought content normal.   Nursing note and vitals reviewed.      Assessment/Plan   Problems Addressed this Visit     None      Visit Diagnoses     Pain of both shoulder joints    -  Primary    Relevant Orders    BERNARDINO With / DsDNA, RNP, Sjogrens A / B, Bolanos    ANCA Panel    CBC Auto Differential    C-reactive Protein    Anti-Smith Antibody    Cyclic Citrul Peptide Antibody, IgG / IgA    Rheumatoid Factor    Sedimentation Rate    Bilateral wrist pain        Relevant Orders     BERNARDINO With / DsDNA, RNP, Sjogrens A / B, Bolanos    ANCA Panel    CBC Auto Differential    C-reactive Protein    Anti-Smith Antibody    Cyclic Citrul Peptide Antibody, IgG / IgA    Rheumatoid Factor    Sedimentation Rate    FH: rheumatoid arthritis        Relevant Orders    BERNARDINO With / DsDNA, RNP, Sjogrens A / B, Bolanos    ANCA Panel    CBC Auto Differential    C-reactive Protein    Anti-Smith Antibody    Cyclic Citrul Peptide Antibody, IgG / IgA    Rheumatoid Factor    Sedimentation Rate    Hx of [place] spotted fever        Relevant Orders    BERNARDINO With / DsDNA, RNP, Sjogrens A / B, Bolanos    ANCA Panel    CBC Auto Differential    C-reactive Protein    Anti-Smith Antibody    Cyclic Citrul Peptide Antibody, IgG / IgA    Rheumatoid Factor    Sedimentation Rate    Allergic contact dermatitis due to plants, except food        Relevant Medications    triamcinolone (KENALOG) 0.025 % ointment        Shoulder and wrist pain with +FH rheumatoid arthritis--check panel as bilateral. No obvious tenosynovitis. Could also be post-inflammatory from RMSF, no sign of reinfection, typically considered immune once over acute infection.  Contact derm--no bull's eye rash, no recent tick bite--treat presumptive--FU if not settling--1 week.   Continue tick repelling clothing.

## 2020-02-19 LAB
ANA SER QL: NEGATIVE
BASOPHILS # BLD AUTO: 0.01 10*3/MM3 (ref 0–0.2)
BASOPHILS NFR BLD AUTO: 0.2 % (ref 0–1.5)
C-ANCA TITR SER IF: NORMAL TITER
CCP IGA+IGG SERPL IA-ACNC: 7 UNITS (ref 0–19)
CRP SERPL-MCNC: 0.19 MG/DL (ref 0–0.5)
ENA SM AB SER-ACNC: <0.2 AI (ref 0–0.9)
EOSINOPHIL # BLD AUTO: 0.13 10*3/MM3 (ref 0–0.4)
EOSINOPHIL NFR BLD AUTO: 2.6 % (ref 0.3–6.2)
ERYTHROCYTE [DISTWIDTH] IN BLOOD BY AUTOMATED COUNT: 12.5 % (ref 12.3–15.4)
ERYTHROCYTE [SEDIMENTATION RATE] IN BLOOD BY WESTERGREN METHOD: 1 MM/HR (ref 0–15)
HCT VFR BLD AUTO: 47.5 % (ref 37.5–51)
HGB BLD-MCNC: 16.2 G/DL (ref 13–17.7)
IMM GRANULOCYTES # BLD AUTO: 0.01 10*3/MM3 (ref 0–0.05)
IMM GRANULOCYTES NFR BLD AUTO: 0.2 % (ref 0–0.5)
LYMPHOCYTES # BLD AUTO: 1.33 10*3/MM3 (ref 0.7–3.1)
LYMPHOCYTES NFR BLD AUTO: 26.1 % (ref 19.6–45.3)
MCH RBC QN AUTO: 30.4 PG (ref 26.6–33)
MCHC RBC AUTO-ENTMCNC: 34.1 G/DL (ref 31.5–35.7)
MCV RBC AUTO: 89.1 FL (ref 79–97)
MONOCYTES # BLD AUTO: 0.34 10*3/MM3 (ref 0.1–0.9)
MONOCYTES NFR BLD AUTO: 6.7 % (ref 5–12)
MYELOPEROXIDASE AB SER IA-ACNC: <9 U/ML (ref 0–9)
NEUTROPHILS # BLD AUTO: 3.27 10*3/MM3 (ref 1.7–7)
NEUTROPHILS NFR BLD AUTO: 64.2 % (ref 42.7–76)
NRBC BLD AUTO-RTO: 0 /100 WBC (ref 0–0.2)
P-ANCA ATYPICAL TITR SER IF: NORMAL TITER
P-ANCA TITR SER IF: NORMAL TITER
PLATELET # BLD AUTO: 252 10*3/MM3 (ref 140–450)
PROTEINASE3 AB SER IA-ACNC: <3.5 U/ML (ref 0–3.5)
RBC # BLD AUTO: 5.33 10*6/MM3 (ref 4.14–5.8)
RHEUMATOID FACT SERPL-ACNC: <10 IU/ML (ref 0–13.9)
WBC # BLD AUTO: 5.09 10*3/MM3 (ref 3.4–10.8)

## 2020-03-08 DIAGNOSIS — R12 HEARTBURN: ICD-10-CM

## 2020-03-09 RX ORDER — FAMOTIDINE 20 MG/1
TABLET, FILM COATED ORAL
Qty: 60 TABLET | Refills: 2 | Status: SHIPPED | OUTPATIENT
Start: 2020-03-09 | End: 2020-06-09

## 2020-03-10 RX ORDER — ALBUTEROL SULFATE 90 UG/1
AEROSOL, METERED RESPIRATORY (INHALATION)
Qty: 18 G | Refills: 0 | Status: SHIPPED | OUTPATIENT
Start: 2020-03-10 | End: 2020-11-09

## 2020-05-27 ENCOUNTER — OFFICE VISIT (OUTPATIENT)
Dept: FAMILY MEDICINE CLINIC | Facility: CLINIC | Age: 30
End: 2020-05-27

## 2020-05-27 VITALS
SYSTOLIC BLOOD PRESSURE: 128 MMHG | WEIGHT: 182 LBS | TEMPERATURE: 98.2 F | HEIGHT: 71 IN | HEART RATE: 68 BPM | DIASTOLIC BLOOD PRESSURE: 74 MMHG | OXYGEN SATURATION: 98 % | BODY MASS INDEX: 25.48 KG/M2
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
## 2020-05-27 DIAGNOSIS — Z11.59 NEED FOR HEPATITIS C SCREENING TEST: ICD-10-CM

## 2020-05-27 DIAGNOSIS — R50.9 FEVER, UNSPECIFIED FEVER CAUSE: ICD-10-CM

## 2020-05-27 PROCEDURE — 99213 OFFICE O/P EST LOW 20 MIN: CPT | Performed by: NURSE PRACTITIONER

## 2020-05-27 RX ORDER — ELECTROLYTES/DEXTROSE
SOLUTION, ORAL ORAL
COMMUNITY
Start: 2019-05-01

## 2020-05-27 RX ORDER — HYDROCORTISONE ACETATE 0.5 %
CREAM (GRAM) TOPICAL
COMMUNITY
Start: 2020-02-01 | End: 2021-11-15

## 2020-05-27 NOTE — PROGRESS NOTES
Subjective   Evan Jose is a 29 y.o. male who presents c/o muscle aches, headache, night sweats that started following a fishing trip. Tested negative for covid 19 when symptoms were present. They have now resolved but wonders if could have tick illness. Did get bit early in the month 3 times.     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 With these tick bites had muscle aches 24 hours after bites. Had headache day after, but initially felt dehydrated at work. Day after had headache and chills. Got evaluated at  and tested negative for COVID 19.   Answers for HPI/ROS submitted by the patient on 5/21/2020   What is the primary reason for your visit?: Other  Please describe your symptoms.: Headache that progressively got worse over the course of a week. 5/13 to 5/19, Chills/sweating and fever 5/15 to 5/17. Fever on 5/16 for 4hrs (101.5)  Have you had these symptoms before?: No  How long have you been having these symptoms?: 5-7 days  Please list any medications you are currently taking for this condition.: Ibuprofen 800mg  Please describe any probable cause for these symptoms. : Migraine, Tick bites, got bit by 3 ticks about 2 weeks ago  Overall feeling better now except less appetite. Diet has not been as good.   Wonders if was migraine--both parents with +history migraine. He had no light sensitivity, though did stay in dark room with HA. Top of skull hurt, then moved to back of head then residual was centered at occiput base for last 2 days. +nausea with headache in waves. No stomach pain. Ticks were very small when removed, no rash.  Does use permethrin to clothes and DEET to ankles and boots, as works outside.  Over the past 7 days really has felt to normal.  The following portions of the patient's history were reviewed and updated as appropriate: allergies, current medications, past family history, past medical history, past social history, past  "surgical history and problem list.    Review of Systems   Constitutional: Positive for appetite change, chills and fever. Negative for activity change, unexpected weight gain and unexpected weight loss.   HENT: Negative.    Eyes: Negative for photophobia.   Respiratory: Negative.    Cardiovascular: Negative.    Gastrointestinal: Positive for nausea.   Endocrine: Negative.    Musculoskeletal: Positive for arthralgias.   Skin: Positive for skin lesions. Negative for rash.   Neurological: Positive for headache.   Hematological: Negative.    Psychiatric/Behavioral: Negative.      /74   Pulse 68   Temp 98.2 °F (36.8 °C) (Oral)   Ht 180.3 cm (71\")   Wt 82.6 kg (182 lb)   SpO2 98%   BMI 25.38 kg/m²     Objective   Physical Exam   Constitutional: He is oriented to person, place, and time. He appears well-developed and well-nourished.   Neck: Normal range of motion. Neck supple. No tracheal deviation present. No thyromegaly present.   Cardiovascular: Normal rate, regular rhythm and normal heart sounds.   Pulses:       Carotid pulses are 2+ on the right side, and 2+ on the left side.  Pulmonary/Chest: Effort normal and breath sounds normal.   Lymphadenopathy:     He has no cervical adenopathy.   Neurological: He is alert and oriented to person, place, and time.   Skin: Skin is warm and dry. Capillary refill takes less than 2 seconds.   3 punctate 2 mm lesions healing to calves (2 on L)   Psychiatric: He has a normal mood and affect. His behavior is normal. Judgment and thought content normal.   Nursing note and vitals reviewed.    Assessment/Plan   Problems Addressed this Visit     None      Visit Diagnoses     Tick bite, subsequent encounter    -  Primary    Relevant Orders    C-reactive Protein    Sedimentation Rate    Rickettsial Fever Group IgG / M    Lyme, IgM, Early Test / Reflex    Ehrlichia Antibody Panel    CBC (No Diff)    Fever, unspecified fever cause        Relevant Orders    C-reactive Protein    " Sedimentation Rate    Rickettsial Fever Group IgG / M    Lyme, IgM, Early Test / Reflex    Ehrlichia Antibody Panel    CBC (No Diff)    Need for hepatitis C screening test        Relevant Orders    Hepatitis C Antibody        Tick bites--reviewed precautions again. As +RMSF antibodies, no reason to recheck this. Check for other tick antibodies. Treatment pending results. Hold for now as symptoms resolved for 7 days.   Screen for hep C based on new general population recommendations

## 2020-05-28 LAB
A PHAGOCYTOPH IGG TITR SER IF: NEGATIVE {TITER}
A PHAGOCYTOPH IGM TITR SER IF: NEGATIVE {TITER}
B BURGDOR IGM SER IA-ACNC: <0.8 INDEX (ref 0–0.79)
CRP SERPL-MCNC: 0.07 MG/DL (ref 0–0.5)
E CHAFFEENSIS IGG TITR SER IF: NEGATIVE {TITER}
E CHAFFEENSIS IGM TITR SER IF: NEGATIVE {TITER}
ERYTHROCYTE [DISTWIDTH] IN BLOOD BY AUTOMATED COUNT: 13.2 % (ref 12.3–15.4)
ERYTHROCYTE [SEDIMENTATION RATE] IN BLOOD BY WESTERGREN METHOD: 1 MM/HR (ref 0–15)
HCT VFR BLD AUTO: 46.2 % (ref 37.5–51)
HCV AB S/CO SERPL IA: <0.1 S/CO RATIO (ref 0–0.9)
HGB BLD-MCNC: 16 G/DL (ref 13–17.7)
MCH RBC QN AUTO: 30.4 PG (ref 26.6–33)
MCHC RBC AUTO-ENTMCNC: 34.6 G/DL (ref 31.5–35.7)
MCV RBC AUTO: 87.8 FL (ref 79–97)
PLATELET # BLD AUTO: 339 10*3/MM3 (ref 140–450)
RBC # BLD AUTO: 5.26 10*6/MM3 (ref 4.14–5.8)
RICK SF IGG TITR SER IF: NORMAL {TITER}
RICK SF IGM TITR SER IF: NORMAL {TITER}
RICK TYPHUS IGG TITR SER IF: NORMAL {TITER}
RICK TYPHUS IGM TITR SER IF: NORMAL {TITER}
WBC # BLD AUTO: 8.94 10*3/MM3 (ref 3.4–10.8)

## 2020-06-09 DIAGNOSIS — R12 HEARTBURN: ICD-10-CM

## 2020-06-09 RX ORDER — FAMOTIDINE 20 MG/1
TABLET, FILM COATED ORAL
Qty: 60 TABLET | Refills: 1 | Status: SHIPPED | OUTPATIENT
Start: 2020-06-09 | End: 2020-11-09

## 2020-09-24 ENCOUNTER — TELEPHONE (OUTPATIENT)
Dept: FAMILY MEDICINE CLINIC | Facility: CLINIC | Age: 30
End: 2020-09-24

## 2020-09-24 RX ORDER — VARENICLINE TARTRATE 1 MG/1
1 TABLET, FILM COATED ORAL 2 TIMES DAILY
Qty: 56 TABLET | Refills: 0 | Status: SHIPPED | OUTPATIENT
Start: 2020-09-24 | End: 2020-10-21 | Stop reason: SDUPTHER

## 2020-09-24 NOTE — TELEPHONE ENCOUNTER
Pt says he is just starting back on chantix. Starting month rx sent, will call next month for continuation pack.

## 2020-10-21 NOTE — TELEPHONE ENCOUNTER
Caller: Evan Jose    Relationship: Self    Best call back number: [phone]  Medication needed:   Requested Prescriptions     Pending Prescriptions Disp Refills   • varenicline (Chantix Continuing Month Pak) 1 MG tablet 56 tablet 0     Sig: Take 1 tablet by mouth 2 (Two) Times a Day.       When do you need the refill by: 10/22/20    What details did the patient provide when requesting the medication: PATIENT HAS 1-2 DOSES LEFT     Does the patient have less than a 3 day supply:  [x] Yes  [] No    What is the patient's preferred pharmacy: [facility] AT [facility] - [phone] [facility] [phone] FX

## 2020-10-22 RX ORDER — VARENICLINE TARTRATE 1 MG/1
1 TABLET, FILM COATED ORAL 2 TIMES DAILY
Qty: 56 TABLET | Refills: 3 | Status: SHIPPED | OUTPATIENT
Start: 2020-10-22 | End: 2020-11-09

## 2020-11-09 ENCOUNTER — OFFICE VISIT (OUTPATIENT)
Dept: FAMILY MEDICINE CLINIC | Facility: CLINIC | Age: 30
End: 2020-11-09

## 2020-11-09 VITALS
SYSTOLIC BLOOD PRESSURE: 124 MMHG | DIASTOLIC BLOOD PRESSURE: 78 MMHG | HEART RATE: 57 BPM | HEIGHT: 71 IN | OXYGEN SATURATION: 99 % | TEMPERATURE: 97.4 F | WEIGHT: 184 LBS | BODY MASS INDEX: 25.76 KG/M2

## 2020-11-09 DIAGNOSIS — Z23 IMMUNIZATION DUE: Primary | ICD-10-CM

## 2020-11-09 DIAGNOSIS — Z00.00 ROUTINE ADULT HEALTH MAINTENANCE: ICD-10-CM

## 2020-11-09 DIAGNOSIS — Z13.220 LIPID SCREENING: ICD-10-CM

## 2020-11-09 DIAGNOSIS — Z72.0 TOBACCO ABUSE: ICD-10-CM

## 2020-11-09 DIAGNOSIS — Z71.6 ENCOUNTER FOR SMOKING CESSATION COUNSELING: ICD-10-CM

## 2020-11-09 DIAGNOSIS — R12 HEARTBURN: ICD-10-CM

## 2020-11-09 DIAGNOSIS — N40.0 ENLARGED PROSTATE: ICD-10-CM

## 2020-11-09 PROCEDURE — 90732 PPSV23 VACC 2 YRS+ SUBQ/IM: CPT | Performed by: NURSE PRACTITIONER

## 2020-11-09 PROCEDURE — 90471 IMMUNIZATION ADMIN: CPT | Performed by: NURSE PRACTITIONER

## 2020-11-09 PROCEDURE — 90472 IMMUNIZATION ADMIN EACH ADD: CPT | Performed by: NURSE PRACTITIONER

## 2020-11-09 PROCEDURE — 99406 BEHAV CHNG SMOKING 3-10 MIN: CPT | Performed by: NURSE PRACTITIONER

## 2020-11-09 PROCEDURE — 90686 IIV4 VACC NO PRSV 0.5 ML IM: CPT | Performed by: NURSE PRACTITIONER

## 2020-11-09 PROCEDURE — 99395 PREV VISIT EST AGE 18-39: CPT | Performed by: NURSE PRACTITIONER

## 2020-11-09 RX ORDER — ALBUTEROL SULFATE 90 UG/1
AEROSOL, METERED RESPIRATORY (INHALATION)
Qty: 18 G | Refills: 0 | Status: SHIPPED | OUTPATIENT
Start: 2020-11-09 | End: 2021-10-11

## 2020-11-09 RX ORDER — FAMOTIDINE 20 MG/1
TABLET, FILM COATED ORAL
Qty: 60 TABLET | Refills: 0 | Status: SHIPPED | OUTPATIENT
Start: 2020-11-09 | End: 2020-12-09

## 2020-11-09 NOTE — PROGRESS NOTES
"Subjective   Evan Jose is a 30 y.o. male who presents for an annual physical.     History of Present Illness   Feeling mentally funny with quitting smoking and pandemic. Is exercising to offset. Up until October has been very busy with work. Now work has plateau. No layoffs.  Frustrated with pandemic and having to travel to areas where people are not mask wearers  The following portions of the patient's history were reviewed and updated as appropriate: allergies, current medications, past family history, past medical history, past social history, past surgical history and problem list.    Review of Systems   Constitutional: Positive for unexpected weight gain. Negative for activity change, appetite change, fatigue and unexpected weight loss.   Respiratory: Negative.  Negative for shortness of breath.    Cardiovascular: Negative.  Negative for chest pain, palpitations and leg swelling.   Neurological: Negative for headache.   Psychiatric/Behavioral: Positive for stress. Negative for behavioral problems, decreased concentration, self-injury, sleep disturbance and depressed mood. The patient is nervous/anxious.      /78   Pulse 57   Temp 97.4 °F (36.3 °C) (Infrared)   Ht 180.3 cm (71\")   Wt 83.5 kg (184 lb)   SpO2 99%   BMI 25.66 kg/m²     Objective   Physical Exam  Vitals signs and nursing note reviewed.   Constitutional:       Appearance: He is well-developed. He is not diaphoretic.   HENT:      Head: Normocephalic and atraumatic.   Neck:      Musculoskeletal: Normal range of motion and neck supple.      Thyroid: No thyromegaly.   Cardiovascular:      Rate and Rhythm: Normal rate and regular rhythm.      Pulses:           Carotid pulses are 2+ on the right side and 2+ on the left side.     Heart sounds: Normal heart sounds.   Pulmonary:      Effort: Pulmonary effort is normal.      Breath sounds: Normal breath sounds.   Lymphadenopathy:      Cervical: No cervical adenopathy.   Psychiatric:         " Behavior: Behavior normal.         Thought Content: Thought content normal.         Judgment: Judgment normal.       Assessment/Plan   Problems Addressed this Visit        Other    Routine adult health maintenance    Relevant Orders    Lipid Panel    Fluarix/Fluzone/Afluria Quad>6 Months (Completed)    Pneumococcal Polysaccharide Vaccine 23-Valent Greater Than or Equal To 1yo Subcutaneous / IM (Completed)    Tobacco abuse    Immunization due - Primary    Relevant Orders    Fluarix/Fluzone/Afluria Quad>6 Months (Completed)    Pneumococcal Polysaccharide Vaccine 23-Valent Greater Than or Equal To 1yo Subcutaneous / IM (Completed)      Other Visit Diagnoses     Lipid screening        Relevant Orders    Lipid Panel    Comprehensive Metabolic Panel    Enlarged prostate        Relevant Orders    PSA DIAGNOSTIC    Encounter for smoking cessation counseling          Diagnoses       Codes Comments    Immunization due    -  Primary ICD-10-CM: Z23  ICD-9-CM: V05.9     Lipid screening     ICD-10-CM: Z13.220  ICD-9-CM: V77.91     Routine adult health maintenance     ICD-10-CM: Z00.00  ICD-9-CM: V70.0     Enlarged prostate     ICD-10-CM: N40.0  ICD-9-CM: 600.00     Encounter for smoking cessation counseling     ICD-10-CM: Z71.6  ICD-9-CM: V65.42, 305.1     Tobacco abuse     ICD-10-CM: Z72.0  ICD-9-CM: 305.1         Update immunizations  Lipid screening  Health maintenance labs  Enlarged prostate--symptoms stable--check PSA  Evan Grijalvaagustin  reports that he has quit smoking. His smoking use included cigars. He started smoking about 5 years ago. He has a 5.00 pack-year smoking history. He has never used smokeless tobacco.. I have educated him on the risk of diseases from using tobacco products such as cancer, COPD and heart disease.     I advised him to quit and he is willing to quit. We have discussed the following method/s for tobacco cessation:  Prescription Medicaiton.  Together we have set a quit date for currently taking  chantix.  He will follow up with me in 6 months or sooner to check on his progress.    I spent 3.5 minutes counseling the patient.

## 2020-11-10 LAB
ALBUMIN SERPL-MCNC: 4.5 G/DL (ref 3.5–5.2)
ALBUMIN/GLOB SERPL: 2.3 G/DL
ALP SERPL-CCNC: 97 U/L (ref 39–117)
ALT SERPL-CCNC: 28 U/L (ref 1–41)
AST SERPL-CCNC: 23 U/L (ref 1–40)
BILIRUB SERPL-MCNC: 0.5 MG/DL (ref 0–1.2)
BUN SERPL-MCNC: 14 MG/DL (ref 6–20)
BUN/CREAT SERPL: 16.5 (ref 7–25)
CALCIUM SERPL-MCNC: 9.3 MG/DL (ref 8.6–10.5)
CHLORIDE SERPL-SCNC: 105 MMOL/L (ref 98–107)
CHOLEST SERPL-MCNC: 144 MG/DL (ref 0–200)
CO2 SERPL-SCNC: 28.1 MMOL/L (ref 22–29)
CREAT SERPL-MCNC: 0.85 MG/DL (ref 0.76–1.27)
GLOBULIN SER CALC-MCNC: 2 GM/DL
GLUCOSE SERPL-MCNC: 87 MG/DL (ref 65–99)
HDLC SERPL-MCNC: 49 MG/DL (ref 40–60)
LDLC SERPL CALC-MCNC: 85 MG/DL (ref 0–100)
POTASSIUM SERPL-SCNC: 4.4 MMOL/L (ref 3.5–5.2)
PROT SERPL-MCNC: 6.5 G/DL (ref 6–8.5)
PSA SERPL-MCNC: 0.7 NG/ML (ref 0–4)
SODIUM SERPL-SCNC: 140 MMOL/L (ref 136–145)
TRIGL SERPL-MCNC: 46 MG/DL (ref 0–150)
VLDLC SERPL CALC-MCNC: 10 MG/DL (ref 5–40)

## 2020-12-04 NOTE — TELEPHONE ENCOUNTER
----- Message from CARINE Lilly sent at 9/7/2017  8:26 AM EDT -----  Please call the patient regarding his abnormal result. Testing indicates [place] Spotted Fever, treat with doxycycline 100mg BId #14 if still having symptoms.  
Patient notified. He states he is still having muscle cramps. rx sent to [facility].   
none

## 2020-12-09 DIAGNOSIS — R12 HEARTBURN: ICD-10-CM

## 2020-12-09 RX ORDER — FAMOTIDINE 20 MG/1
TABLET, FILM COATED ORAL
Qty: 60 TABLET | Refills: 0 | Status: SHIPPED | OUTPATIENT
Start: 2020-12-09 | End: 2021-01-08

## 2021-01-07 ENCOUNTER — OFFICE VISIT (OUTPATIENT)
Dept: FAMILY MEDICINE CLINIC | Facility: CLINIC | Age: 31
End: 2021-01-07

## 2021-01-07 VITALS
WEIGHT: 183 LBS | HEART RATE: 67 BPM | SYSTOLIC BLOOD PRESSURE: 124 MMHG | HEIGHT: 71 IN | BODY MASS INDEX: 25.62 KG/M2 | DIASTOLIC BLOOD PRESSURE: 78 MMHG | OXYGEN SATURATION: 100 % | TEMPERATURE: 98.2 F

## 2021-01-07 DIAGNOSIS — M77.02 MEDIAL EPICONDYLITIS OF BOTH ELBOWS: Primary | ICD-10-CM

## 2021-01-07 DIAGNOSIS — M77.01 MEDIAL EPICONDYLITIS OF BOTH ELBOWS: Primary | ICD-10-CM

## 2021-01-07 DIAGNOSIS — M25.532 BILATERAL WRIST PAIN: ICD-10-CM

## 2021-01-07 DIAGNOSIS — R12 HEARTBURN: ICD-10-CM

## 2021-01-07 DIAGNOSIS — M25.531 BILATERAL WRIST PAIN: ICD-10-CM

## 2021-01-07 PROCEDURE — 99213 OFFICE O/P EST LOW 20 MIN: CPT | Performed by: NURSE PRACTITIONER

## 2021-01-07 RX ORDER — NAPROXEN 500 MG/1
500 TABLET ORAL 2 TIMES DAILY WITH MEALS
Qty: 60 TABLET | Refills: 0 | Status: SHIPPED | OUTPATIENT
Start: 2021-01-07 | End: 2021-11-15

## 2021-01-07 RX ORDER — OMEGA-3S/DHA/EPA/FISH OIL/D3 300MG-1000
400 CAPSULE ORAL DAILY
COMMUNITY

## 2021-01-07 NOTE — PROGRESS NOTES
"Subjective   Evan Jose is a 30 y.o. male who present c/o pain in both hands, wrists and elbows that started 2 weeks ago.    History of Present Illness   Taking mom's meloxicam x 4 days, ordered wrist braces. Wearing day and night. Sometimes pain is better with brace off.  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   The following portions of the patient's history were reviewed and updated as appropriate: allergies, current medications, past family history, past medical history, past social history, past surgical history and problem list.    Review of Systems   Constitutional: Positive for activity change. Negative for chills, fever and unexpected weight change.   Gastrointestinal: Negative.    Endocrine: Negative.    Musculoskeletal: Positive for arthralgias and myalgias. Negative for gait problem, joint swelling, neck pain and neck stiffness.   Skin: Negative.    Allergic/Immunologic: Negative.    Neurological: Negative for weakness and numbness.   Hematological: Negative.    Psychiatric/Behavioral: Negative.      /78   Pulse 67   Temp 98.2 °F (36.8 °C) (Infrared)   Ht 180.3 cm (71\")   Wt 83 kg (183 lb)   SpO2 100%   BMI 25.52 kg/m²     Objective   Physical Exam  Constitutional:       Appearance: Normal appearance.   Musculoskeletal:      Right elbow: He exhibits normal range of motion. Tenderness found. Medial epicondyle tenderness noted.      Left elbow: He exhibits normal range of motion. Tenderness found. Medial epicondyle tenderness noted.      Right wrist: He exhibits normal range of motion and no tenderness.      Left wrist: He exhibits normal range of motion and no tenderness.      Right forearm: He exhibits tenderness and bony tenderness. He exhibits no swelling, no edema, no deformity and no laceration.      Left forearm: He exhibits tenderness and bony tenderness. He exhibits no swelling, no edema, no deformity and no laceration.      " Right hand: He exhibits normal range of motion and no tenderness. Normal sensation noted. Normal strength noted.      Left hand: He exhibits normal range of motion and no tenderness. Normal sensation noted. Normal strength noted.   Neurological:      Mental Status: He is alert.       Assessment/Plan   Problems Addressed this Visit     None      Visit Diagnoses     Medial epicondylitis of both elbows    -  Primary    Relevant Medications    naproxen (Naprosyn) 500 MG tablet    Bilateral wrist pain        Relevant Medications    naproxen (Naprosyn) 500 MG tablet      Diagnoses       Codes Comments    Medial epicondylitis of both elbows    -  Primary ICD-10-CM: M77.01, M77.02  ICD-9-CM: 726.31     Bilateral wrist pain     ICD-10-CM: M25.531, M25.532  ICD-9-CM: 719.43         Epicondylitis--trial of naproxen to help  madhu wrist pain--NSAIDS--ok to hold bracing as exam not consistent with carpal tunnel. If feels helpful, would wear only at night. PT if not settling 2 weeks. Ergonomics of home work discussed. Most significant on exam is tenderness in mid medial forearms. PT would be helpful with this       Answers for HPI/ROS submitted by the patient on 1/6/2021   What is the primary reason for your visit?: Other  Please describe your symptoms.: Throbbing pain in both wrists that shoots pain to my fingertips and up to my elbow., Sharp pain in my knuckles (maybe once or twice a day) but usually doesn't last very long  Have you had these symptoms before?: No  How long have you been having these symptoms?: Greater than 2 weeks  Please list any medications you are currently taking for this condition.: NSAID, Meloxicam 15mg  Please describe any probable cause for these symptoms. : Working at home on my laptop since September. 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

## 2021-01-08 RX ORDER — FAMOTIDINE 20 MG/1
TABLET, FILM COATED ORAL
Qty: 180 TABLET | Refills: 3 | Status: SHIPPED | OUTPATIENT
Start: 2021-01-08 | End: 2021-12-27

## 2021-04-16 ENCOUNTER — BULK ORDERING (OUTPATIENT)
Dept: CASE MANAGEMENT | Facility: OTHER | Age: 31
End: 2021-04-16

## 2021-04-16 DIAGNOSIS — Z23 IMMUNIZATION DUE: ICD-10-CM

## 2021-09-02 DIAGNOSIS — Z20.2 POSSIBLE EXPOSURE TO STD: Primary | ICD-10-CM

## 2021-10-11 RX ORDER — ALBUTEROL SULFATE 90 UG/1
AEROSOL, METERED RESPIRATORY (INHALATION)
Qty: 18 G | Refills: 0 | Status: SHIPPED | OUTPATIENT
Start: 2021-10-11 | End: 2021-10-21 | Stop reason: SDUPTHER

## 2021-10-14 ENCOUNTER — TELEPHONE (OUTPATIENT)
Dept: FAMILY MEDICINE CLINIC | Facility: CLINIC | Age: 31
End: 2021-10-14

## 2021-10-14 NOTE — TELEPHONE ENCOUNTER
[facility] on [facility] wants to know if they can get a new script for pts chantix. He has not been on this in a while. They are only able to get the 1mg dose in stock so not able to fill the starter pack unless 1mg is cut in half. Please advise.

## 2021-10-15 RX ORDER — VARENICLINE TARTRATE 1 MG/1
TABLET, FILM COATED ORAL
Qty: 60 TABLET | Refills: 5 | Status: SHIPPED | OUTPATIENT
Start: 2021-10-15 | End: 2022-01-30

## 2021-10-21 RX ORDER — ALBUTEROL SULFATE 90 UG/1
AEROSOL, METERED RESPIRATORY (INHALATION)
Qty: 18 G | Refills: 0 | Status: SHIPPED | OUTPATIENT
Start: 2021-10-21 | End: 2022-01-11

## 2021-11-15 ENCOUNTER — OFFICE VISIT (OUTPATIENT)
Dept: FAMILY MEDICINE CLINIC | Facility: CLINIC | Age: 31
End: 2021-11-15

## 2021-11-15 VITALS
SYSTOLIC BLOOD PRESSURE: 122 MMHG | HEART RATE: 70 BPM | WEIGHT: 188 LBS | DIASTOLIC BLOOD PRESSURE: 70 MMHG | BODY MASS INDEX: 26.32 KG/M2 | OXYGEN SATURATION: 98 % | HEIGHT: 71 IN | TEMPERATURE: 97.4 F

## 2021-11-15 DIAGNOSIS — Z00.00 ROUTINE ADULT HEALTH MAINTENANCE: Primary | ICD-10-CM

## 2021-11-15 DIAGNOSIS — Z87.891 FORMER CIGARETTE SMOKER: ICD-10-CM

## 2021-11-15 DIAGNOSIS — Z71.85 IMMUNIZATION COUNSELING: ICD-10-CM

## 2021-11-15 PROCEDURE — 99395 PREV VISIT EST AGE 18-39: CPT | Performed by: NURSE PRACTITIONER

## 2021-11-15 NOTE — PROGRESS NOTES
"Subjective   Evan Jose is a 31 y.o. male who presents for an annual physical.   History of Present Illness   Feeling healthy overall, feeling better since stopped smoking. Only side effect is fatigue with chantix  Got BAM 9/18/2021 had only very mild symptoms  Has already had flu vaccine. Will get COVID booster 90 days after BAM  The following portions of the patient's history were reviewed and updated as appropriate: allergies, current medications, past family history, past medical history, past social history, past surgical history and problem list.    Review of Systems   Constitutional: Positive for activity change and unexpected weight change (5 lbs only with stopping smoking). Negative for appetite change.   HENT: Positive for congestion.    Eyes: Negative.  Negative for visual disturbance.   Respiratory: Negative.    Cardiovascular: Negative.  Negative for chest pain.   Gastrointestinal: Negative.  Negative for constipation and diarrhea.   Endocrine: Negative.    Genitourinary: Negative for difficulty urinating and frequency.   Musculoskeletal: Negative.    Neurological: Negative for weakness and headaches.   Hematological: Negative.    Psychiatric/Behavioral: Negative.  Negative for dysphoric mood.     /70   Pulse 70   Temp 97.4 °F (36.3 °C) (Infrared)   Ht 180.3 cm (71\")   Wt 85.3 kg (188 lb)   SpO2 98%   BMI 26.22 kg/m²     Objective   Physical Exam  Vitals and nursing note reviewed.   Constitutional:       Appearance: He is well-developed. He is not diaphoretic.   HENT:      Head: Normocephalic and atraumatic.   Neck:      Thyroid: No thyromegaly.   Cardiovascular:      Rate and Rhythm: Normal rate and regular rhythm.      Pulses:           Carotid pulses are 2+ on the right side and 2+ on the left side.     Heart sounds: Normal heart sounds.   Pulmonary:      Effort: Pulmonary effort is normal.      Breath sounds: Normal breath sounds.   Musculoskeletal:      Cervical back: Normal range " of motion and neck supple.   Lymphadenopathy:      Cervical: No cervical adenopathy.   Skin:     Capillary Refill: Capillary refill takes less than 2 seconds.   Neurological:      General: No focal deficit present.   Psychiatric:         Behavior: Behavior normal.         Thought Content: Thought content normal.         Judgment: Judgment normal.       Assessment/Plan   Problems Addressed this Visit        Health Encounters    Routine adult health maintenance - Primary    Relevant Orders    CBC (No Diff)    Comprehensive Metabolic Panel    Lipid Panel      Other Visit Diagnoses     Former cigarette smoker        Immunization counseling          Diagnoses       Codes Comments    Routine adult health maintenance    -  Primary ICD-10-CM: Z00.00  ICD-9-CM: V70.0     Former cigarette smoker     ICD-10-CM: Z87.891  ICD-9-CM: V15.82     Immunization counseling     ICD-10-CM: Z71.85  ICD-9-CM: V65.49         Health maintenance--update monitoring  Former smoker--congrats on his success. Would try to continue chantix a full 6 months. Ok to take both doses at night, to see if better tolerated.  Immunization counseling--COVID counseling, understands. Other preventative counseling given regarding transmission.        Answers for HPI/ROS submitted by the patient on 11/8/2021  What is the primary reason for your visit?: Physical    This document is intended for medical expert use only. Reading of this document by patients and/or patient's family without participating medical staff guidance may result in misinterpretation and unintended morbidity.  Any interpretation of such data is the responsibility of the patient and/or family member responsible for the patient in concert with their primary or specialist providers, not to be left for sources of online searches such as [employer], [employer] or similar queries. Relying on these approaches to knowledge may result in misinterpretation, misguided goals of care and even death should patients  or family members try recommendations outside of the realm of professional medical care in a supervised way.    Please allow 3-5 business days for recommendations based on new results    Go to the ER for any possible lifethreatening symptoms such as chest pain or shortness of air. I    rsonally spent 23 minutes reviewing the chart before the visit, time with the patient, and time documenting the visit.

## 2021-11-16 LAB
ALBUMIN SERPL-MCNC: 4.5 G/DL (ref 4–5)
ALBUMIN/GLOB SERPL: 1.7 {RATIO} (ref 1.2–2.2)
ALP SERPL-CCNC: 104 IU/L (ref 44–121)
ALT SERPL-CCNC: 30 IU/L (ref 0–44)
AST SERPL-CCNC: 25 IU/L (ref 0–40)
BILIRUB SERPL-MCNC: 0.8 MG/DL (ref 0–1.2)
BUN SERPL-MCNC: 14 MG/DL (ref 6–20)
BUN/CREAT SERPL: 15 (ref 9–20)
CALCIUM SERPL-MCNC: 9.7 MG/DL (ref 8.7–10.2)
CHLORIDE SERPL-SCNC: 104 MMOL/L (ref 96–106)
CHOLEST SERPL-MCNC: 164 MG/DL (ref 100–199)
CO2 SERPL-SCNC: 26 MMOL/L (ref 20–29)
CREAT SERPL-MCNC: 0.92 MG/DL (ref 0.76–1.27)
ERYTHROCYTE [DISTWIDTH] IN BLOOD BY AUTOMATED COUNT: 12.1 % (ref 11.6–15.4)
GLOBULIN SER CALC-MCNC: 2.6 G/DL (ref 1.5–4.5)
GLUCOSE SERPL-MCNC: 88 MG/DL (ref 65–99)
HCT VFR BLD AUTO: 52.1 % (ref 37.5–51)
HDLC SERPL-MCNC: 44 MG/DL
HGB BLD-MCNC: 18 G/DL (ref 13–17.7)
LDLC SERPL CALC-MCNC: 106 MG/DL (ref 0–99)
MCH RBC QN AUTO: 30.4 PG (ref 26.6–33)
MCHC RBC AUTO-ENTMCNC: 34.5 G/DL (ref 31.5–35.7)
MCV RBC AUTO: 88 FL (ref 79–97)
PLATELET # BLD AUTO: 239 X10E3/UL (ref 150–450)
POTASSIUM SERPL-SCNC: 4.6 MMOL/L (ref 3.5–5.2)
PROT SERPL-MCNC: 7.1 G/DL (ref 6–8.5)
RBC # BLD AUTO: 5.93 X10E6/UL (ref 4.14–5.8)
SODIUM SERPL-SCNC: 142 MMOL/L (ref 134–144)
TRIGL SERPL-MCNC: 75 MG/DL (ref 0–149)
VLDLC SERPL CALC-MCNC: 14 MG/DL (ref 5–40)
WBC # BLD AUTO: 5.5 X10E3/UL (ref 3.4–10.8)

## 2021-11-17 DIAGNOSIS — D58.2 ELEVATED HEMOGLOBIN (HCC): Primary | ICD-10-CM

## 2021-11-17 DIAGNOSIS — R71.8 ELEVATED HEMATOCRIT: ICD-10-CM

## 2021-11-17 NOTE — PROGRESS NOTES
Abnormal high blood counts, repeat 1 month, can be serious. Otherwise normal labs. Repeat CBC with Diff, 1 month, retic count and peripheral smear 1 month

## 2021-12-25 DIAGNOSIS — R12 HEARTBURN: ICD-10-CM

## 2021-12-27 RX ORDER — FAMOTIDINE 20 MG/1
TABLET, FILM COATED ORAL
Qty: 60 TABLET | Refills: 1 | Status: SHIPPED | OUTPATIENT
Start: 2021-12-27 | End: 2022-02-01

## 2022-01-11 RX ORDER — ALBUTEROL SULFATE 90 UG/1
AEROSOL, METERED RESPIRATORY (INHALATION)
Qty: 8.5 G | Refills: 0 | Status: SHIPPED | OUTPATIENT
Start: 2022-01-11 | End: 2022-07-12 | Stop reason: SDUPTHER

## 2022-01-26 ENCOUNTER — OFFICE VISIT (OUTPATIENT)
Dept: FAMILY MEDICINE CLINIC | Facility: CLINIC | Age: 32
End: 2022-01-26

## 2022-01-26 VITALS
HEIGHT: 71 IN | BODY MASS INDEX: 27.44 KG/M2 | TEMPERATURE: 97.3 F | WEIGHT: 196 LBS | HEART RATE: 83 BPM | SYSTOLIC BLOOD PRESSURE: 128 MMHG | OXYGEN SATURATION: 98 % | DIASTOLIC BLOOD PRESSURE: 64 MMHG

## 2022-01-26 DIAGNOSIS — M25.50 POST-COVID CHRONIC JOINT PAIN: ICD-10-CM

## 2022-01-26 DIAGNOSIS — U09.9 POST-COVID CHRONIC JOINT PAIN: ICD-10-CM

## 2022-01-26 DIAGNOSIS — R59.1 LYMPHADENOPATHY OF HEAD AND NECK: Primary | ICD-10-CM

## 2022-01-26 DIAGNOSIS — G89.29 POST-COVID CHRONIC JOINT PAIN: ICD-10-CM

## 2022-01-26 DIAGNOSIS — D22.9 NEVUS: ICD-10-CM

## 2022-01-26 DIAGNOSIS — D17.22 LIPOMA OF LEFT UPPER EXTREMITY: ICD-10-CM

## 2022-01-26 PROCEDURE — 99214 OFFICE O/P EST MOD 30 MIN: CPT | Performed by: NURSE PRACTITIONER

## 2022-01-26 RX ORDER — PREDNISONE 20 MG/1
40 TABLET ORAL DAILY
Qty: 10 TABLET | Refills: 0 | Status: SHIPPED | OUTPATIENT
Start: 2022-01-26 | End: 2022-06-14

## 2022-01-26 NOTE — PROGRESS NOTES
"Subjective   Evan Jose is a 31 y.o. male who presents c/o swelling on right side of neck x 1 week. Has had increase in joint aches in the last month, unsure if related. Also wants a referral to dermatology.     History of Present Illness   R neck lymph node enlarged painless x 1 week. Does admit nicking self shaving a few weeks ago. No Ear pain, no sore throat, no other symptoms  Was able to quit smoking in November  L upper arm with bump x 1 month  Pain and burning in wrists and muscle x 3 months. Wonders if associated with inactivity. FH rheumatoid  The following portions of the patient's history were reviewed and updated as appropriate: allergies, current medications, past family history, past medical history, past social history, past surgical history and problem list.    Review of Systems   Constitutional: Negative for chills, fatigue, fever and unexpected weight change.   HENT: Positive for rhinorrhea. Negative for congestion, ear pain, sinus pain and sore throat.    Respiratory: Negative for cough.    Cardiovascular: Negative.    Endocrine: Negative.    Musculoskeletal: Positive for arthralgias. Negative for back pain, joint swelling and neck pain.   Skin: Positive for wound.   Allergic/Immunologic: Negative.    Neurological: Negative for weakness and numbness.   Hematological: Positive for adenopathy.   Psychiatric/Behavioral: Negative.      /64   Pulse 83   Temp 97.3 °F (36.3 °C) (Infrared)   Ht 180.3 cm (71\")   Wt 88.9 kg (196 lb)   SpO2 98%   BMI 27.34 kg/m²     Objective   Physical Exam  Vitals and nursing note reviewed.   Constitutional:       General: He is not in acute distress.     Appearance: Normal appearance. He is well-developed.   HENT:      Right Ear: A middle ear effusion is present.      Left Ear: Tympanic membrane normal.   Neck:      Thyroid: No thyromegaly.   Cardiovascular:      Rate and Rhythm: Normal rate and regular rhythm.      Heart sounds: Normal heart sounds. "   Pulmonary:      Effort: Pulmonary effort is normal.      Breath sounds: Normal breath sounds.   Chest:   Breasts:      Right: No axillary adenopathy or supraclavicular adenopathy.      Left: No axillary adenopathy or supraclavicular adenopathy.       Musculoskeletal:      Cervical back: Normal range of motion and neck supple.   Lymphadenopathy:      Head:      Right side of head: Submandibular (1 x 2 cm) adenopathy present. No submental, tonsillar, preauricular, posterior auricular or occipital adenopathy.      Left side of head: No submental, submandibular, tonsillar, preauricular, posterior auricular or occipital adenopathy.      Cervical: No cervical adenopathy.      Upper Body:      Right upper body: No supraclavicular, axillary or pectoral adenopathy.      Left upper body: No supraclavicular, axillary or pectoral adenopathy.   Skin:     Comments: Small scabs to neck in shaving area, likely resolving folliculitis  1 cm subcutaneous lipoma   Neurological:      Mental Status: He is alert.   Psychiatric:         Mood and Affect: Mood normal.         Behavior: Behavior normal.         Thought Content: Thought content normal.         Judgment: Judgment normal.       Assessment/Plan   Problems Addressed this Visit     None      Visit Diagnoses     Lymphadenopathy of head and neck    -  Primary    Post-COVID chronic joint pain        Relevant Medications    predniSONE (DELTASONE) 20 MG tablet    Lipoma of left upper extremity        Nevus        Relevant Orders    Ambulatory Referral to Dermatology      Diagnoses       Codes Comments    Lymphadenopathy of head and neck    -  Primary ICD-10-CM: R59.1  ICD-9-CM: 785.6     Post-COVID chronic joint pain     ICD-10-CM: M25.50, U09.9, G89.29  ICD-9-CM: 719.40, 139.8, 338.29     Lipoma of left upper extremity     ICD-10-CM: D17.22  ICD-9-CM: 214.8     Nevus     ICD-10-CM: D22.9  ICD-9-CM: 216.9         Lymph node--US 1 month  Post COVID joint pain--treat with steroids.  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 Does wear hat always and sunscreen at times    This document is intended for medical expert use only. Reading of this document by patients and/or patient's family without participating medical staff guidance may result in misinterpretation and unintended morbidity.  Any interpretation of such data is the responsibility of the patient and/or family member responsible for the patient in concert with their primary or specialist providers, not to be left for sources of online searches such as [employer], [employer] or similar queries. Relying on these approaches to knowledge may result in misinterpretation, misguided goals of care and even death should patients or family members try recommendations outside of the realm of professional medical care in a supervised way.    Please allow 3-5 business days for recommendations based on new results    Go to the ER for any possible lifethreatening symptoms such as chest pain or shortness of air.

## 2022-02-01 DIAGNOSIS — R12 HEARTBURN: ICD-10-CM

## 2022-02-01 RX ORDER — FAMOTIDINE 20 MG/1
TABLET, FILM COATED ORAL
Qty: 60 TABLET | Refills: 1 | Status: SHIPPED | OUTPATIENT
Start: 2022-02-01

## 2022-05-10 DIAGNOSIS — M25.50 POST-COVID CHRONIC JOINT PAIN: ICD-10-CM

## 2022-05-10 DIAGNOSIS — U09.9 POST-COVID CHRONIC JOINT PAIN: ICD-10-CM

## 2022-05-10 DIAGNOSIS — G89.29 POST-COVID CHRONIC JOINT PAIN: ICD-10-CM

## 2022-05-11 RX ORDER — PREDNISONE 20 MG/1
40 TABLET ORAL DAILY
Qty: 10 TABLET | Refills: 0 | OUTPATIENT
Start: 2022-05-11

## 2022-07-12 RX ORDER — ALBUTEROL SULFATE 90 UG/1
1-2 AEROSOL, METERED RESPIRATORY (INHALATION) EVERY 4 HOURS PRN
Qty: 8.5 G | Refills: 0 | Status: SHIPPED | OUTPATIENT
Start: 2022-07-12

## 2024-02-21 ENCOUNTER — OFFICE (OUTPATIENT)
Dept: URBAN - METROPOLITAN AREA CLINIC 76 | Facility: CLINIC | Age: 34
End: 2024-02-21

## 2024-02-21 VITALS
WEIGHT: 207 LBS | HEIGHT: 70 IN | DIASTOLIC BLOOD PRESSURE: 73 MMHG | OXYGEN SATURATION: 96 % | HEART RATE: 73 BPM | SYSTOLIC BLOOD PRESSURE: 113 MMHG

## 2024-02-21 DIAGNOSIS — K21.9 GASTRO-ESOPHAGEAL REFLUX DISEASE WITHOUT ESOPHAGITIS: ICD-10-CM

## 2024-02-21 DIAGNOSIS — R10.13 EPIGASTRIC PAIN: ICD-10-CM

## 2024-02-21 PROCEDURE — 99204 OFFICE O/P NEW MOD 45 MIN: CPT | Performed by: INTERNAL MEDICINE

## 2024-11-07 VITALS
DIASTOLIC BLOOD PRESSURE: 45 MMHG | OXYGEN SATURATION: 93 % | DIASTOLIC BLOOD PRESSURE: 56 MMHG | DIASTOLIC BLOOD PRESSURE: 53 MMHG | HEART RATE: 55 BPM | RESPIRATION RATE: 18 BRPM | WEIGHT: 220 LBS | RESPIRATION RATE: 6 BRPM | DIASTOLIC BLOOD PRESSURE: 95 MMHG | OXYGEN SATURATION: 93 % | SYSTOLIC BLOOD PRESSURE: 135 MMHG | SYSTOLIC BLOOD PRESSURE: 102 MMHG | SYSTOLIC BLOOD PRESSURE: 135 MMHG | RESPIRATION RATE: 16 BRPM | HEART RATE: 55 BPM | RESPIRATION RATE: 18 BRPM | SYSTOLIC BLOOD PRESSURE: 104 MMHG | RESPIRATION RATE: 13 BRPM | OXYGEN SATURATION: 88 % | DIASTOLIC BLOOD PRESSURE: 59 MMHG | HEART RATE: 69 BPM | HEART RATE: 50 BPM | SYSTOLIC BLOOD PRESSURE: 126 MMHG | DIASTOLIC BLOOD PRESSURE: 53 MMHG | DIASTOLIC BLOOD PRESSURE: 82 MMHG | HEART RATE: 57 BPM | RESPIRATION RATE: 18 BRPM | HEART RATE: 56 BPM | SYSTOLIC BLOOD PRESSURE: 109 MMHG | DIASTOLIC BLOOD PRESSURE: 59 MMHG | DIASTOLIC BLOOD PRESSURE: 56 MMHG | SYSTOLIC BLOOD PRESSURE: 107 MMHG | HEIGHT: 70 IN | RESPIRATION RATE: 16 BRPM | HEART RATE: 58 BPM | DIASTOLIC BLOOD PRESSURE: 45 MMHG | DIASTOLIC BLOOD PRESSURE: 56 MMHG | TEMPERATURE: 97.2 F | HEART RATE: 55 BPM | SYSTOLIC BLOOD PRESSURE: 109 MMHG | DIASTOLIC BLOOD PRESSURE: 56 MMHG | RESPIRATION RATE: 13 BRPM | RESPIRATION RATE: 16 BRPM | SYSTOLIC BLOOD PRESSURE: 126 MMHG | HEART RATE: 56 BPM | HEART RATE: 56 BPM | DIASTOLIC BLOOD PRESSURE: 51 MMHG | HEART RATE: 58 BPM | TEMPERATURE: 98.2 F | DIASTOLIC BLOOD PRESSURE: 95 MMHG | DIASTOLIC BLOOD PRESSURE: 53 MMHG | HEART RATE: 57 BPM | HEART RATE: 69 BPM | HEART RATE: 69 BPM | DIASTOLIC BLOOD PRESSURE: 82 MMHG | OXYGEN SATURATION: 95 % | SYSTOLIC BLOOD PRESSURE: 104 MMHG | WEIGHT: 220 LBS | RESPIRATION RATE: 14 BRPM | RESPIRATION RATE: 14 BRPM | DIASTOLIC BLOOD PRESSURE: 45 MMHG | SYSTOLIC BLOOD PRESSURE: 107 MMHG | DIASTOLIC BLOOD PRESSURE: 53 MMHG | RESPIRATION RATE: 16 BRPM | OXYGEN SATURATION: 95 % | OXYGEN SATURATION: 93 % | TEMPERATURE: 97.2 F | HEART RATE: 55 BPM | SYSTOLIC BLOOD PRESSURE: 135 MMHG | SYSTOLIC BLOOD PRESSURE: 109 MMHG | OXYGEN SATURATION: 94 % | DIASTOLIC BLOOD PRESSURE: 59 MMHG | SYSTOLIC BLOOD PRESSURE: 109 MMHG | SYSTOLIC BLOOD PRESSURE: 102 MMHG | HEART RATE: 56 BPM | HEART RATE: 50 BPM | RESPIRATION RATE: 6 BRPM | OXYGEN SATURATION: 95 % | SYSTOLIC BLOOD PRESSURE: 104 MMHG | RESPIRATION RATE: 13 BRPM | TEMPERATURE: 97.2 F | SYSTOLIC BLOOD PRESSURE: 107 MMHG | RESPIRATION RATE: 13 BRPM | RESPIRATION RATE: 14 BRPM | DIASTOLIC BLOOD PRESSURE: 45 MMHG | RESPIRATION RATE: 18 BRPM | WEIGHT: 220 LBS | DIASTOLIC BLOOD PRESSURE: 51 MMHG | OXYGEN SATURATION: 95 % | DIASTOLIC BLOOD PRESSURE: 95 MMHG | OXYGEN SATURATION: 98 % | SYSTOLIC BLOOD PRESSURE: 109 MMHG | DIASTOLIC BLOOD PRESSURE: 51 MMHG | SYSTOLIC BLOOD PRESSURE: 135 MMHG | OXYGEN SATURATION: 95 % | OXYGEN SATURATION: 88 % | OXYGEN SATURATION: 98 % | TEMPERATURE: 98.2 F | HEART RATE: 57 BPM | SYSTOLIC BLOOD PRESSURE: 104 MMHG | DIASTOLIC BLOOD PRESSURE: 95 MMHG | SYSTOLIC BLOOD PRESSURE: 102 MMHG | DIASTOLIC BLOOD PRESSURE: 51 MMHG | OXYGEN SATURATION: 95 % | SYSTOLIC BLOOD PRESSURE: 126 MMHG | HEART RATE: 57 BPM | HEART RATE: 56 BPM | DIASTOLIC BLOOD PRESSURE: 51 MMHG | DIASTOLIC BLOOD PRESSURE: 95 MMHG | SYSTOLIC BLOOD PRESSURE: 104 MMHG | DIASTOLIC BLOOD PRESSURE: 95 MMHG | DIASTOLIC BLOOD PRESSURE: 82 MMHG | TEMPERATURE: 97.2 F | SYSTOLIC BLOOD PRESSURE: 107 MMHG | RESPIRATION RATE: 16 BRPM | RESPIRATION RATE: 16 BRPM | HEIGHT: 70 IN | HEART RATE: 58 BPM | TEMPERATURE: 97.2 F | DIASTOLIC BLOOD PRESSURE: 59 MMHG | HEIGHT: 70 IN | RESPIRATION RATE: 13 BRPM | SYSTOLIC BLOOD PRESSURE: 126 MMHG | TEMPERATURE: 98.2 F | HEART RATE: 50 BPM | HEART RATE: 50 BPM | RESPIRATION RATE: 14 BRPM | SYSTOLIC BLOOD PRESSURE: 107 MMHG | RESPIRATION RATE: 13 BRPM | OXYGEN SATURATION: 88 % | TEMPERATURE: 98.2 F | OXYGEN SATURATION: 94 % | WEIGHT: 220 LBS | HEART RATE: 57 BPM | DIASTOLIC BLOOD PRESSURE: 56 MMHG | DIASTOLIC BLOOD PRESSURE: 82 MMHG | DIASTOLIC BLOOD PRESSURE: 45 MMHG | TEMPERATURE: 97.2 F | DIASTOLIC BLOOD PRESSURE: 56 MMHG | HEART RATE: 57 BPM | OXYGEN SATURATION: 98 % | OXYGEN SATURATION: 95 % | OXYGEN SATURATION: 88 % | SYSTOLIC BLOOD PRESSURE: 126 MMHG | SYSTOLIC BLOOD PRESSURE: 104 MMHG | RESPIRATION RATE: 6 BRPM | WEIGHT: 220 LBS | OXYGEN SATURATION: 88 % | OXYGEN SATURATION: 98 % | HEART RATE: 50 BPM | HEART RATE: 69 BPM | DIASTOLIC BLOOD PRESSURE: 95 MMHG | HEART RATE: 69 BPM | SYSTOLIC BLOOD PRESSURE: 126 MMHG | RESPIRATION RATE: 18 BRPM | OXYGEN SATURATION: 98 % | TEMPERATURE: 98.2 F | RESPIRATION RATE: 14 BRPM | TEMPERATURE: 98.2 F | WEIGHT: 220 LBS | HEART RATE: 56 BPM | DIASTOLIC BLOOD PRESSURE: 82 MMHG | RESPIRATION RATE: 13 BRPM | RESPIRATION RATE: 6 BRPM | RESPIRATION RATE: 16 BRPM | TEMPERATURE: 97.2 F | RESPIRATION RATE: 18 BRPM | DIASTOLIC BLOOD PRESSURE: 59 MMHG | DIASTOLIC BLOOD PRESSURE: 59 MMHG | OXYGEN SATURATION: 93 % | OXYGEN SATURATION: 93 % | HEART RATE: 58 BPM | SYSTOLIC BLOOD PRESSURE: 102 MMHG | RESPIRATION RATE: 14 BRPM | OXYGEN SATURATION: 94 % | RESPIRATION RATE: 14 BRPM | SYSTOLIC BLOOD PRESSURE: 126 MMHG | SYSTOLIC BLOOD PRESSURE: 102 MMHG | HEIGHT: 70 IN | HEART RATE: 56 BPM | OXYGEN SATURATION: 98 % | SYSTOLIC BLOOD PRESSURE: 107 MMHG | RESPIRATION RATE: 6 BRPM | OXYGEN SATURATION: 93 % | OXYGEN SATURATION: 98 % | RESPIRATION RATE: 18 BRPM | OXYGEN SATURATION: 88 % | DIASTOLIC BLOOD PRESSURE: 53 MMHG | HEIGHT: 70 IN | WEIGHT: 220 LBS | HEART RATE: 55 BPM | OXYGEN SATURATION: 94 % | DIASTOLIC BLOOD PRESSURE: 82 MMHG | HEART RATE: 50 BPM | SYSTOLIC BLOOD PRESSURE: 135 MMHG | SYSTOLIC BLOOD PRESSURE: 104 MMHG | HEIGHT: 70 IN | OXYGEN SATURATION: 93 % | SYSTOLIC BLOOD PRESSURE: 107 MMHG | HEART RATE: 58 BPM | HEART RATE: 50 BPM | OXYGEN SATURATION: 94 % | HEART RATE: 58 BPM | DIASTOLIC BLOOD PRESSURE: 51 MMHG | DIASTOLIC BLOOD PRESSURE: 53 MMHG | RESPIRATION RATE: 6 BRPM | DIASTOLIC BLOOD PRESSURE: 82 MMHG | DIASTOLIC BLOOD PRESSURE: 51 MMHG | SYSTOLIC BLOOD PRESSURE: 109 MMHG | OXYGEN SATURATION: 94 % | HEART RATE: 69 BPM | DIASTOLIC BLOOD PRESSURE: 59 MMHG | HEART RATE: 58 BPM | DIASTOLIC BLOOD PRESSURE: 56 MMHG | DIASTOLIC BLOOD PRESSURE: 53 MMHG | DIASTOLIC BLOOD PRESSURE: 45 MMHG | RESPIRATION RATE: 6 BRPM | HEART RATE: 69 BPM | HEART RATE: 55 BPM | OXYGEN SATURATION: 88 % | HEART RATE: 55 BPM | HEART RATE: 57 BPM | OXYGEN SATURATION: 94 % | HEIGHT: 70 IN | SYSTOLIC BLOOD PRESSURE: 135 MMHG | SYSTOLIC BLOOD PRESSURE: 102 MMHG | SYSTOLIC BLOOD PRESSURE: 102 MMHG | TEMPERATURE: 98.2 F | SYSTOLIC BLOOD PRESSURE: 109 MMHG | SYSTOLIC BLOOD PRESSURE: 135 MMHG | DIASTOLIC BLOOD PRESSURE: 45 MMHG

## 2024-11-12 ENCOUNTER — AMBULATORY SURGICAL CENTER (AMBULATORY)
Age: 34
End: 2024-11-12

## 2024-11-12 ENCOUNTER — OFFICE (AMBULATORY)
Age: 34
End: 2024-11-12

## 2024-11-12 ENCOUNTER — AMBULATORY SURGICAL CENTER (AMBULATORY)
Dept: URBAN - METROPOLITAN AREA SURGERY 17 | Facility: SURGERY | Age: 34
End: 2024-11-12

## 2024-11-12 ENCOUNTER — OFFICE (AMBULATORY)
Dept: URBAN - METROPOLITAN AREA PATHOLOGY 4 | Facility: PATHOLOGY | Age: 34
End: 2024-11-12

## 2024-11-12 DIAGNOSIS — K29.70 GASTRITIS, UNSPECIFIED, WITHOUT BLEEDING: ICD-10-CM

## 2024-11-12 DIAGNOSIS — K31.89 OTHER DISEASES OF STOMACH AND DUODENUM: ICD-10-CM

## 2024-11-12 DIAGNOSIS — K21.00 GASTRO-ESOPHAGEAL REFLUX DISEASE WITH ESOPHAGITIS, WITHOUT B: ICD-10-CM

## 2024-11-12 PROBLEM — K20.90 ESOPHAGITIS, UNSPECIFIED WITHOUT BLEEDING: Status: ACTIVE | Noted: 2024-11-12

## 2024-11-12 PROBLEM — K20.80 OTHER ESOPHAGITIS WITHOUT BLEEDING: Status: ACTIVE | Noted: 2024-11-12

## 2024-11-12 LAB
GI HISTOLOGY: A. STOMACH ANTRUM: (no result)
GI HISTOLOGY: B. LOWER THIRD OF THE ESOPHAGUS: (no result)
GI HISTOLOGY: PDF REPORT: (no result)

## 2024-11-12 PROCEDURE — 43239 EGD BIOPSY SINGLE/MULTIPLE: CPT | Performed by: INTERNAL MEDICINE

## 2024-11-12 PROCEDURE — 88305 TISSUE EXAM BY PATHOLOGIST: CPT | Performed by: PATHOLOGY

## 2024-11-12 RX ORDER — OMEPRAZOLE 40 MG/1
40 CAPSULE, DELAYED RELEASE ORAL
Qty: 30 | Refills: 11 | Status: ACTIVE

## 2024-11-12 NOTE — SERVICEHPINOTES
Mr. Rickey Dinh is a 34 YO F with PMH of Asthma, depression, HTN, GERD presents reflux and indigestionHe reports heartburn his entire life, he has been on famotidine, ranitidine, and currently only on omeprazole. He reports Omeprazole works. No nausea or vomiting or abd pain. No diarrhea or constipation. NO melena or hematochezia. NO unintended weigh loss. brNo EGD and colonoscopy PSH: appendectomy brFamily hx: Skin in father and grandfather, prostate in grandfather. Mom with diverticulitis. brSocial hx: former smoker, quit 2 yrs ago, rarely alcohol - 6 times in last 2.5 yrs, no illicit drug Data reviewed:[id number] PCP note. Negative H.pylori. hgb 16, MCV 88, PLTS 23. Ast 39, ALT 71, ALK phosp 90, T.bili 0.9

## 2024-11-12 NOTE — SERVICEHPINOTES
Mr. Severino Herrera is a 34 YO F with PMH of Asthma, depression, HTN, GERD presents reflux and indigestionHe reports heartburn his entire life, he has been on famotidine, ranitidine, and currently only on omeprazole. He reports Omeprazole works. No nausea or vomiting or abd pain. No diarrhea or constipation. NO melena or hematochezia. NO unintended weigh loss. brNo EGD and colonoscopy PSH: appendectomy brFamily hx: Skin in father and grandfather, prostate in grandfather. Mom with diverticulitis. brSocial hx: former smoker, quit 2 yrs ago, rarely alcohol - 6 times in last 2.5 yrs, no illicit drug Data reviewed:[id number] PCP note. Negative H.pylori. hgb 16, MCV 88, PLTS 23. Ast 39, ALT 71, ALK phosp 90, T.bili 0.9

## 2024-11-12 NOTE — SERVICEHPINOTES
Mr. Jono Brennan is a 34 YO F with PMH of Asthma, depression, HTN, GERD presents reflux and indigestionHe reports heartburn his entire life, he has been on famotidine, ranitidine, and currently only on omeprazole. He reports Omeprazole works. No nausea or vomiting or abd pain. No diarrhea or constipation. NO melena or hematochezia. NO unintended weigh loss. brNo EGD and colonoscopy PSH: appendectomy brFamily hx: Skin in father and grandfather, prostate in grandfather. Mom with diverticulitis. brSocial hx: former smoker, quit 2 yrs ago, rarely alcohol - 6 times in last 2.5 yrs, no illicit drug Data reviewed:[id number] PCP note. Negative H.pylori. hgb 16, MCV 88, PLTS 23. Ast 39, ALT 71, ALK phosp 90, T.bili 0.9

## 2024-11-25 ENCOUNTER — OFFICE (AMBULATORY)
Age: 34
End: 2024-11-25
Payer: COMMERCIAL

## 2024-11-25 ENCOUNTER — OFFICE (AMBULATORY)
Dept: URBAN - METROPOLITAN AREA CLINIC 76 | Facility: CLINIC | Age: 34
End: 2024-11-25
Payer: COMMERCIAL

## 2024-11-25 VITALS
HEART RATE: 77 BPM | HEART RATE: 77 BPM | HEART RATE: 77 BPM | SYSTOLIC BLOOD PRESSURE: 134 MMHG | HEIGHT: 70 IN | WEIGHT: 220 LBS | WEIGHT: 220 LBS | HEIGHT: 70 IN | OXYGEN SATURATION: 95 % | DIASTOLIC BLOOD PRESSURE: 81 MMHG | HEART RATE: 77 BPM | DIASTOLIC BLOOD PRESSURE: 81 MMHG | DIASTOLIC BLOOD PRESSURE: 81 MMHG | OXYGEN SATURATION: 95 % | HEART RATE: 77 BPM | HEIGHT: 70 IN | SYSTOLIC BLOOD PRESSURE: 134 MMHG | DIASTOLIC BLOOD PRESSURE: 81 MMHG | HEART RATE: 77 BPM | WEIGHT: 220 LBS | WEIGHT: 220 LBS | HEIGHT: 70 IN | SYSTOLIC BLOOD PRESSURE: 134 MMHG | OXYGEN SATURATION: 95 % | SYSTOLIC BLOOD PRESSURE: 134 MMHG | OXYGEN SATURATION: 95 % | OXYGEN SATURATION: 95 % | SYSTOLIC BLOOD PRESSURE: 134 MMHG | DIASTOLIC BLOOD PRESSURE: 81 MMHG | OXYGEN SATURATION: 95 % | WEIGHT: 220 LBS | DIASTOLIC BLOOD PRESSURE: 81 MMHG | WEIGHT: 220 LBS | SYSTOLIC BLOOD PRESSURE: 134 MMHG | HEART RATE: 77 BPM | OXYGEN SATURATION: 95 % | HEIGHT: 70 IN | HEIGHT: 70 IN | DIASTOLIC BLOOD PRESSURE: 81 MMHG | HEIGHT: 70 IN | WEIGHT: 220 LBS | SYSTOLIC BLOOD PRESSURE: 134 MMHG

## 2024-11-25 DIAGNOSIS — K21.9 GASTRO-ESOPHAGEAL REFLUX DISEASE WITHOUT ESOPHAGITIS: ICD-10-CM

## 2024-11-25 DIAGNOSIS — K20.80 OTHER ESOPHAGITIS WITHOUT BLEEDING: ICD-10-CM

## 2024-11-25 PROCEDURE — 99214 OFFICE O/P EST MOD 30 MIN: CPT | Performed by: INTERNAL MEDICINE
